# Patient Record
Sex: MALE | Race: WHITE | NOT HISPANIC OR LATINO | Employment: FULL TIME | URBAN - METROPOLITAN AREA
[De-identification: names, ages, dates, MRNs, and addresses within clinical notes are randomized per-mention and may not be internally consistent; named-entity substitution may affect disease eponyms.]

---

## 2018-01-11 ENCOUNTER — HOSPITAL ENCOUNTER (OUTPATIENT)
Dept: PREADMISSION TESTING | Facility: HOSPITAL | Age: 28
Discharge: HOME | End: 2018-01-11
Attending: SURGERY
Payer: MEDICAID

## 2018-01-11 VITALS
TEMPERATURE: 98 F | OXYGEN SATURATION: 99 % | BODY MASS INDEX: 30.96 KG/M2 | HEART RATE: 76 BPM | HEIGHT: 69 IN | RESPIRATION RATE: 16 BRPM | SYSTOLIC BLOOD PRESSURE: 127 MMHG | WEIGHT: 209 LBS | DIASTOLIC BLOOD PRESSURE: 74 MMHG

## 2018-01-11 DIAGNOSIS — Z01.818 PRE-OP TESTING: Primary | ICD-10-CM

## 2018-01-11 LAB
BASOPHILS # BLD AUTO: 0.06 K/UL
BASOPHILS NFR BLD: 0.8 %
DIFFERENTIAL METHOD: ABNORMAL
EOSINOPHIL # BLD AUTO: 0.2 K/UL
EOSINOPHIL NFR BLD: 2.9 %
ERYTHROCYTE [DISTWIDTH] IN BLOOD BY AUTOMATED COUNT: 12.4 %
HCT VFR BLD AUTO: 38.7 %
HGB BLD-MCNC: 13.8 G/DL
LYMPHOCYTES # BLD AUTO: 3.5 K/UL
LYMPHOCYTES NFR BLD: 44.1 %
MCH RBC QN AUTO: 30.7 PG
MCHC RBC AUTO-ENTMCNC: 35.7 G/DL
MCV RBC AUTO: 86 FL
MONOCYTES # BLD AUTO: 0.5 K/UL
MONOCYTES NFR BLD: 6.4 %
NEUTROPHILS # BLD AUTO: 3.6 K/UL
NEUTROPHILS NFR BLD: 45.8 %
PLATELET # BLD AUTO: 248 K/UL
PMV BLD AUTO: 9 FL
RBC # BLD AUTO: 4.5 M/UL
WBC # BLD AUTO: 7.82 K/UL

## 2018-01-11 PROCEDURE — 36415 COLL VENOUS BLD VENIPUNCTURE: CPT

## 2018-01-11 PROCEDURE — 85025 COMPLETE CBC W/AUTO DIFF WBC: CPT

## 2018-01-11 RX ORDER — IBUPROFEN 200 MG
400 TABLET ORAL EVERY 6 HOURS PRN
COMMUNITY
End: 2022-11-23

## 2018-01-11 NOTE — DISCHARGE INSTRUCTIONS
Your surgery is scheduled for _Thursday Jan. 18, 2018___________________.    Call [phone] between 2 p.m. and 5 p.m. on   _Wednesday______________ to find out your arrival time for the day of your surgery.      Please report to SAME DAY SURGERY UNIT on the 2nd FLOOR at _______ a.m.  Use front door entrance. The doors open at 0530 am.          INSTRUCTIONS IMPORTANT!!!  ¨ Do not eat or drink after 12 midnight-including water. OK to brush teeth, no   gum, candy or mints!      _x___  Prep instructions:    SHOWER     _x___  Please shower using Hibiclens soap the night before AND  the morning of  your surgery/procedure. Do not use Hibiclens on your face or genitals                 Rinse hibiclens off completely.  _x___  No shaving of procedural area at least 4-5 days before surgery due to  increased risk of skin irritation and/or possible infection.  _x___  No powder, lotions or creams to your body.  _x___  You may wear only deodorant on the day of surgery.  _x___  Please remove all jewelry, including piercings and leave at home.  _x___  No money or valuables needed. Please leave at home.  You may bring your  cell phone.  ____  Please bring any documents given by your doctor.  _x___  If going home the same day, arrange for a ride home. You will not be able to drive if Anesthesia was used.  _x__  Wear loose fitting clothing. Allow for dressings, bandages.  _x___  Stop Aspirin, Ibuprofen, Motrin and Aleve at least 3-5 days before surgery, unless otherwise instructed by your doctor, or the nurse.              You MAY use Tylenol/acetaminophen until day of surgery.  _x___  Call MD for temperature above 101 degrees.        _x___ Stop taking any Fish Oil supplement or any Vitamins that contain Vitamin  E at least 5 days prior to surgery.              I have read or had read and explained to me, and understand the above information.  Additional comments or instructions:Please call   [phone] if you have any questions  regarding the instructions above.

## 2018-01-18 ENCOUNTER — ANESTHESIA (OUTPATIENT)
Dept: SURGERY | Facility: HOSPITAL | Age: 28
End: 2018-01-18
Payer: MEDICAID

## 2018-01-18 ENCOUNTER — ANESTHESIA EVENT (OUTPATIENT)
Dept: SURGERY | Facility: HOSPITAL | Age: 28
End: 2018-01-18
Payer: MEDICAID

## 2018-01-18 ENCOUNTER — HOSPITAL ENCOUNTER (OUTPATIENT)
Facility: HOSPITAL | Age: 28
Discharge: HOME | End: 2018-01-18
Attending: SURGERY | Admitting: SURGERY
Payer: MEDICAID

## 2018-01-18 VITALS
DIASTOLIC BLOOD PRESSURE: 56 MMHG | WEIGHT: 208.94 LBS | BODY MASS INDEX: 30.95 KG/M2 | OXYGEN SATURATION: 98 % | RESPIRATION RATE: 16 BRPM | TEMPERATURE: 98 F | HEIGHT: 69 IN | HEART RATE: 84 BPM | SYSTOLIC BLOOD PRESSURE: 115 MMHG

## 2018-01-18 DIAGNOSIS — L05.91 PILONIDAL CYST: Primary | ICD-10-CM

## 2018-01-18 PROCEDURE — 25000003 PHARM REV CODE 250: Performed by: NURSE ANESTHETIST, CERTIFIED REGISTERED

## 2018-01-18 PROCEDURE — 71000039 HC RECOVERY, EACH ADD'L HOUR: Performed by: SURGERY

## 2018-01-18 PROCEDURE — 88304 TISSUE EXAM BY PATHOLOGIST: CPT

## 2018-01-18 PROCEDURE — 63600175 PHARM REV CODE 636 W HCPCS: Performed by: NURSE ANESTHETIST, CERTIFIED REGISTERED

## 2018-01-18 PROCEDURE — S0030 INJECTION, METRONIDAZOLE: HCPCS | Performed by: SURGERY

## 2018-01-18 PROCEDURE — 25000003 PHARM REV CODE 250: Performed by: SURGERY

## 2018-01-18 PROCEDURE — 63600175 PHARM REV CODE 636 W HCPCS: Performed by: ANESTHESIOLOGY

## 2018-01-18 PROCEDURE — 00300 ANES ALL PX INTEG H/N/PTRUNK: CPT | Performed by: SURGERY

## 2018-01-18 PROCEDURE — 37000009 HC ANESTHESIA EA ADD 15 MINS: Performed by: SURGERY

## 2018-01-18 PROCEDURE — 25000003 PHARM REV CODE 250: Performed by: ANESTHESIOLOGY

## 2018-01-18 PROCEDURE — D9220A PRA ANESTHESIA: Mod: ANES,,, | Performed by: ANESTHESIOLOGY

## 2018-01-18 PROCEDURE — 88304 TISSUE EXAM BY PATHOLOGIST: CPT | Mod: 26,,,

## 2018-01-18 PROCEDURE — 71000033 HC RECOVERY, INTIAL HOUR: Performed by: SURGERY

## 2018-01-18 PROCEDURE — 63600175 PHARM REV CODE 636 W HCPCS: Performed by: SURGERY

## 2018-01-18 PROCEDURE — 36000707: Performed by: SURGERY

## 2018-01-18 PROCEDURE — D9220A PRA ANESTHESIA: Mod: CRNA,,, | Performed by: NURSE ANESTHETIST, CERTIFIED REGISTERED

## 2018-01-18 PROCEDURE — 36000706: Performed by: SURGERY

## 2018-01-18 PROCEDURE — 37000008 HC ANESTHESIA 1ST 15 MINUTES: Performed by: SURGERY

## 2018-01-18 PROCEDURE — 71000015 HC POSTOP RECOV 1ST HR: Performed by: SURGERY

## 2018-01-18 RX ORDER — MIDAZOLAM HYDROCHLORIDE 1 MG/ML
INJECTION, SOLUTION INTRAMUSCULAR; INTRAVENOUS
Status: DISCONTINUED | OUTPATIENT
Start: 2018-01-18 | End: 2018-01-18

## 2018-01-18 RX ORDER — GLYCOPYRROLATE 0.2 MG/ML
INJECTION INTRAMUSCULAR; INTRAVENOUS
Status: DISCONTINUED | OUTPATIENT
Start: 2018-01-18 | End: 2018-01-18

## 2018-01-18 RX ORDER — CIPROFLOXACIN 2 MG/ML
400 INJECTION, SOLUTION INTRAVENOUS
Status: COMPLETED | OUTPATIENT
Start: 2018-01-18 | End: 2018-01-18

## 2018-01-18 RX ORDER — FENTANYL CITRATE 50 UG/ML
INJECTION, SOLUTION INTRAMUSCULAR; INTRAVENOUS
Status: DISCONTINUED | OUTPATIENT
Start: 2018-01-18 | End: 2018-01-18

## 2018-01-18 RX ORDER — SUCCINYLCHOLINE CHLORIDE 20 MG/ML
INJECTION INTRAMUSCULAR; INTRAVENOUS
Status: DISCONTINUED | OUTPATIENT
Start: 2018-01-18 | End: 2018-01-18

## 2018-01-18 RX ORDER — SODIUM CHLORIDE 0.9 % (FLUSH) 0.9 %
3 SYRINGE (ML) INJECTION
Status: DISCONTINUED | OUTPATIENT
Start: 2018-01-18 | End: 2018-01-18 | Stop reason: HOSPADM

## 2018-01-18 RX ORDER — ONDANSETRON 2 MG/ML
INJECTION INTRAMUSCULAR; INTRAVENOUS
Status: DISCONTINUED | OUTPATIENT
Start: 2018-01-18 | End: 2018-01-18

## 2018-01-18 RX ORDER — ACETAMINOPHEN 10 MG/ML
1000 INJECTION, SOLUTION INTRAVENOUS ONCE
Status: COMPLETED | OUTPATIENT
Start: 2018-01-18 | End: 2018-01-18

## 2018-01-18 RX ORDER — ROCURONIUM BROMIDE 10 MG/ML
INJECTION, SOLUTION INTRAVENOUS
Status: DISCONTINUED | OUTPATIENT
Start: 2018-01-18 | End: 2018-01-18

## 2018-01-18 RX ORDER — METOCLOPRAMIDE HYDROCHLORIDE 5 MG/ML
10 INJECTION INTRAMUSCULAR; INTRAVENOUS EVERY 10 MIN PRN
Status: DISCONTINUED | OUTPATIENT
Start: 2018-01-18 | End: 2018-01-18 | Stop reason: HOSPADM

## 2018-01-18 RX ORDER — SODIUM CHLORIDE, SODIUM LACTATE, POTASSIUM CHLORIDE, CALCIUM CHLORIDE 600; 310; 30; 20 MG/100ML; MG/100ML; MG/100ML; MG/100ML
INJECTION, SOLUTION INTRAVENOUS CONTINUOUS
Status: DISCONTINUED | OUTPATIENT
Start: 2018-01-18 | End: 2018-01-18 | Stop reason: HOSPADM

## 2018-01-18 RX ORDER — HYDROMORPHONE HYDROCHLORIDE 2 MG/ML
0.2 INJECTION, SOLUTION INTRAMUSCULAR; INTRAVENOUS; SUBCUTANEOUS EVERY 5 MIN PRN
Status: DISCONTINUED | OUTPATIENT
Start: 2018-01-18 | End: 2018-01-18 | Stop reason: HOSPADM

## 2018-01-18 RX ORDER — LIDOCAINE HYDROCHLORIDE 10 MG/ML
0.5 INJECTION, SOLUTION EPIDURAL; INFILTRATION; INTRACAUDAL; PERINEURAL ONCE
Status: DISCONTINUED | OUTPATIENT
Start: 2018-01-18 | End: 2018-01-18 | Stop reason: HOSPADM

## 2018-01-18 RX ORDER — MEPERIDINE HYDROCHLORIDE 50 MG/ML
12.5 INJECTION INTRAMUSCULAR; INTRAVENOUS; SUBCUTANEOUS ONCE AS NEEDED
Status: DISCONTINUED | OUTPATIENT
Start: 2018-01-18 | End: 2018-01-18 | Stop reason: HOSPADM

## 2018-01-18 RX ORDER — MEPERIDINE HYDROCHLORIDE 50 MG/ML
12.5 INJECTION INTRAMUSCULAR; INTRAVENOUS; SUBCUTANEOUS ONCE
Status: COMPLETED | OUTPATIENT
Start: 2018-01-18 | End: 2018-01-18

## 2018-01-18 RX ORDER — SODIUM CHLORIDE 0.9 G/100ML
IRRIGANT IRRIGATION
Status: DISCONTINUED | OUTPATIENT
Start: 2018-01-18 | End: 2018-01-18 | Stop reason: HOSPADM

## 2018-01-18 RX ORDER — METRONIDAZOLE 500 MG/100ML
500 INJECTION, SOLUTION INTRAVENOUS
Status: COMPLETED | OUTPATIENT
Start: 2018-01-18 | End: 2018-01-18

## 2018-01-18 RX ORDER — PROPOFOL 10 MG/ML
VIAL (ML) INTRAVENOUS
Status: DISCONTINUED | OUTPATIENT
Start: 2018-01-18 | End: 2018-01-18

## 2018-01-18 RX ORDER — LIDOCAINE HCL/PF 100 MG/5ML
SYRINGE (ML) INTRAVENOUS
Status: DISCONTINUED | OUTPATIENT
Start: 2018-01-18 | End: 2018-01-18

## 2018-01-18 RX ORDER — DIPHENHYDRAMINE HYDROCHLORIDE 50 MG/ML
25 INJECTION INTRAMUSCULAR; INTRAVENOUS EVERY 6 HOURS PRN
Status: DISCONTINUED | OUTPATIENT
Start: 2018-01-18 | End: 2018-01-18 | Stop reason: HOSPADM

## 2018-01-18 RX ORDER — PHENYLEPHRINE HYDROCHLORIDE 10 MG/ML
INJECTION INTRAVENOUS
Status: DISCONTINUED | OUTPATIENT
Start: 2018-01-18 | End: 2018-01-18

## 2018-01-18 RX ADMIN — CIPROFLOXACIN 400 MG: 2 INJECTION, SOLUTION INTRAVENOUS at 12:01

## 2018-01-18 RX ADMIN — PROPOFOL 20 MG: 10 INJECTION, EMULSION INTRAVENOUS at 01:01

## 2018-01-18 RX ADMIN — MIDAZOLAM HYDROCHLORIDE 2 MG: 1 INJECTION, SOLUTION INTRAMUSCULAR; INTRAVENOUS at 12:01

## 2018-01-18 RX ADMIN — ONDANSETRON 4 MG: 2 INJECTION, SOLUTION INTRAMUSCULAR; INTRAVENOUS at 12:01

## 2018-01-18 RX ADMIN — HYDROMORPHONE HYDROCHLORIDE 0.2 MG: 2 INJECTION INTRAMUSCULAR; INTRAVENOUS; SUBCUTANEOUS at 02:01

## 2018-01-18 RX ADMIN — ACETAMINOPHEN 1000 MG: 10 INJECTION, SOLUTION INTRAVENOUS at 02:01

## 2018-01-18 RX ADMIN — SUCCINYLCHOLINE CHLORIDE 140 MG: 20 INJECTION, SOLUTION INTRAMUSCULAR; INTRAVENOUS at 12:01

## 2018-01-18 RX ADMIN — ROCURONIUM BROMIDE 25 MG: 10 INJECTION, SOLUTION INTRAVENOUS at 01:01

## 2018-01-18 RX ADMIN — MEPERIDINE HYDROCHLORIDE 12.5 MG: 50 INJECTION INTRAMUSCULAR; INTRAVENOUS; SUBCUTANEOUS at 01:01

## 2018-01-18 RX ADMIN — PROPOFOL 180 MG: 10 INJECTION, EMULSION INTRAVENOUS at 12:01

## 2018-01-18 RX ADMIN — METRONIDAZOLE 500 MG: 500 INJECTION, SOLUTION INTRAVENOUS at 12:01

## 2018-01-18 RX ADMIN — ROCURONIUM BROMIDE 5 MG: 10 INJECTION, SOLUTION INTRAVENOUS at 12:01

## 2018-01-18 RX ADMIN — GLYCOPYRROLATE 0.2 MG: 0.2 INJECTION, SOLUTION INTRAMUSCULAR; INTRAVENOUS at 12:01

## 2018-01-18 RX ADMIN — PHENYLEPHRINE HYDROCHLORIDE 200 MCG: 10 INJECTION INTRAVENOUS at 01:01

## 2018-01-18 RX ADMIN — LIDOCAINE HYDROCHLORIDE 100 MG: 20 INJECTION, SOLUTION INTRAVENOUS at 12:01

## 2018-01-18 RX ADMIN — FENTANYL CITRATE 100 MCG: 50 INJECTION INTRAMUSCULAR; INTRAVENOUS at 12:01

## 2018-01-18 RX ADMIN — SODIUM CHLORIDE, SODIUM LACTATE, POTASSIUM CHLORIDE, AND CALCIUM CHLORIDE 10 ML/HR: 600; 310; 30; 20 INJECTION, SOLUTION INTRAVENOUS at 10:01

## 2018-01-18 RX ADMIN — PHENYLEPHRINE HYDROCHLORIDE 300 MCG: 10 INJECTION INTRAVENOUS at 01:01

## 2018-01-18 NOTE — ANESTHESIA POSTPROCEDURE EVALUATION
"Anesthesia Post Evaluation    Patient: Matt Yoo    Procedure(s) Performed: Procedure(s) (LRB):  [id number] (N/A)    Final Anesthesia Type: general  Patient location during evaluation: PACU  Patient participation: Yes- Able to Participate  Level of consciousness: awake and alert, oriented and awake  Post-procedure vital signs: reviewed and stable  Pain management: adequate  Airway patency: patent  PONV status at discharge: No PONV  Anesthetic complications: no      Cardiovascular status: blood pressure returned to baseline  Respiratory status: unassisted and spontaneous ventilation  Hydration status: euvolemic  Follow-up not needed.        Visit Vitals  BP (!) 116/59   Pulse 88   Temp 36.5 °C (97.7 °F) (Oral)   Resp 18   Ht 5' 9" (1.753 m)   Wt 94.8 kg (208 lb 15 oz)   SpO2 96%   BMI 30.85 kg/m²       Pain/Henok Score: Pain Assessment Performed: Yes (1/18/2018  2:51 PM)  Presence of Pain: complains of pain/discomfort (1/18/2018  2:51 PM)  Pain Rating Prior to Med Admin: 5 (1/18/2018  2:35 PM)  Henok Score: 10 (1/18/2018  2:15 PM)      "

## 2018-01-18 NOTE — DISCHARGE INSTRUCTIONS
[facility]  Office # [phone]     Discharge Instructions for Same Day Surgery     Call the office for and appointment if one has not already been made.     Diet: Drink plenty of fluids the first 48 hours and you may resume your   usual diet.     Activity: No heavy lifting (over 10 pounds), pushing or pulling until your   post op visit. Your doctor's office may have told you to limit your lifting to less weight, or even no weight.  Be sure to follow those instructions.    Note: You may ride in a car and you may drive when comfortable.     Do not drive, drink alcohol, or sign legal documents for 24 hours, or if taking narcotic pain medication.    Dressings: Remove the dressing 48 hours after surgery. You may shower   48 hours after surgery and you may wash your hair.     If you have steri strips ( appears to be strips of white tape) on   your incision, leave them on.     Medical: Call the doctor for any of the following problems: fever above 101,   severe pain, bleeding, or abdominal distention (swelling).   If constipated you may take any stool softener you choose.     Occasionally small areas of skin numbness or an unpleasant skin sensation can result. Also, you may find that your incision is swollen and tender for a few days.  Some redness around sutures and staples is a normal reaction, but if the discomfort persists or worsens, call you doctor.   Fall Prevention  Millions of people fall every year and injure themselves. You may have had anesthesia or sedation which may increase your risk of falling. You may have health issues that put you at an increased risk of falling.     Here are ways to reduce your risk of falling.  ·   · Make your home safe by keeping walkways clear of objects you may trip over.  · Use non-slip pads under rugs. Do not use area rugs or small throw rugs.  · Use non-slip mats in bathtubs and showers.  · Install handrails and lights on staircases.  · Do not walk in  poorly lit areas.  · Do not stand on chairs or wobbly ladders.  · Use caution when reaching overhead or looking upward. This position can cause a loss of balance.  · Be sure your shoes fit properly, have non-slip bottoms and are in good condition.   · Wear shoes both inside and out. Avoid going barefoot or wearing slippers.  · Be cautious when going up and down stairs, curbs, and when walking on uneven sidewalks.  · If your balance is poor, consider using a cane or walker.  · If your fall was related to alcohol use, stop or limit alcohol intake.   · If your fall was related to use of sleeping medicines, talk to your doctor about this. You may need to reduce your dosage at bedtime if you awaken during the night to go to the bathroom.    · To reduce the need for nighttime bathroom trips:  ¨ Avoid drinking fluids for several hours before going to bed  ¨ Empty your bladder before going to bed  ¨ Men can keep a urinal at the bedside  · Stay as active as you can. Balance, flexibility, strength, and endurance all come from exercise. They all play a role in preventing falls. Ask your healthcare provider which types of activity are right for you.  · Get your vision checked on a regular basis.  · If you have pets, know where they are before you stand up or walk so you don't trip over them.  · Use night lights.

## 2018-01-18 NOTE — BRIEF OP NOTE
[facility]  Brief Operative Note     SUMMARY     Surgery Date: 1/18/2018     Surgeon(s) and Role:     * Raphael Rouse MD - Primary    Assisting Surgeon: None    Pre-op Diagnosis:  Pilonidal cyst [L05.91]    Post-op Diagnosis:  Post-Op Diagnosis Codes:     * Pilonidal cyst [L05.91]    Procedure(s) (LRB):  [id number] (N/A)    Anesthesia: General    Description of the findings of the procedure: same    Findings/Key Components:same  Estimated Blood Loss: 0         Specimens:   Specimen (12h ago through future)    Start     Ordered    01/18/18 1317  Specimen to Pathology - Surgery  Once     Comments:  pilonidal cyst      01/18/18 1332          Discharge Note    SUMMARY     Admit Date: 1/18/2018    Discharge Date and Time:  01/18/2018 1:38 PM    Hospital Course sds to home no complications    Final Diagnosis: Post-Op Diagnosis Codes:     * Pilonidal cyst [L05.91]    Disposition: Home or Self Care    Follow Up/Patient Instructions:     Medications:  Reconciled Home Medications:   Current Discharge Medication List      CONTINUE these medications which have NOT CHANGED    Details   DM/P-EPHED/ACETAMINOPH/DOXYLAM (NYQUIL ORAL) Take 1 tablet by mouth daily as needed.      ibuprofen (ADVIL,MOTRIN) 200 MG tablet Take 400 mg by mouth every 6 (six) hours as needed for Pain.      buprenorphine HCl (SUBUTEX) 8 mg Subl Place 4 mg under the tongue 4 (four) times daily as needed.  Refills: 0             Discharge Procedure Orders  Diet Adult Regular     Shower on day dressing removed (No bath)     Notify your health care provider if you experience any of the following:  temperature >100.4     Notify your health care provider if you experience any of the following:  severe uncontrolled pain     Notify your health care provider if you experience any of the following:  redness, tenderness, or signs of infection (pain, swelling, redness, odor or green/yellow discharge around incision site)     Remove  dressing in 48 hours       Follow-up Information     Raphael Rouse MD In 1 week.    Specialty:  General Surgery  Contact information:  [address] [place], [place] [id number] [phone]

## 2018-01-18 NOTE — ANESTHESIA PREPROCEDURE EVALUATION
01/18/2018  Matt Yoo is a 27 y.o., male.    Anesthesia Evaluation    I have reviewed the Patient Summary Reports.     I have reviewed the Medications.     Review of Systems  Anesthesia Hx:  No problems with previous Anesthesia   Denies Personal Hx of Anesthesia complications.   Hematology/Oncology:  Hematology Normal   Oncology Normal     EENT/Dental:EENT/Dental Normal   Cardiovascular:  Cardiovascular Normal     Pulmonary:  Pulmonary Normal    Renal/:  Renal/ Normal     Hepatic/GI:  Hepatic/GI Normal    Musculoskeletal:  Musculoskeletal Normal    Neurological:  Neurology Normal    Endocrine:  Endocrine Normal    Dermatological:  Skin Normal    Psych:  Psychiatric Normal           Physical Exam  General:  Well nourished    Airway/Jaw/Neck:  Airway Findings: Mouth Opening: Normal Tongue: Normal  Mallampati: II      Dental:  Dental Findings: In tact   Chest/Lungs:  Chest/Lungs Clear    Heart/Vascular:  Heart Findings: Normal Heart murmur: negative       Mental Status:  Mental Status Findings:  Cooperative, Alert and Oriented         Anesthesia Plan  Type of Anesthesia, risks & benefits discussed:  Anesthesia Type:  general, MAC, regional  Patient's Preference:   Intra-op Monitoring Plan: standard ASA monitors  Intra-op Monitoring Plan Comments:   Post Op Pain Control Plan: multimodal analgesia  Post Op Pain Control Plan Comments:   Induction:   IV  Beta Blocker:  Patient is not currently on a Beta-Blocker (No further documentation required).       Informed Consent: Patient understands risks and agrees with Anesthesia plan.  Questions answered. Anesthesia consent signed with patient.  ASA Score: 2     Day of Surgery Review of History & Physical:            Ready For Surgery From Anesthesia Perspective.

## 2018-01-18 NOTE — OR NURSING
Transported by RN to [facility] per stretcher.  Patient belongings bag, to include patient's cell phone and cash, on stretcher with patient returned to [facility].

## 2018-01-18 NOTE — TRANSFER OF CARE
"Anesthesia Transfer of Care Note    Patient: Matt Yoo    Procedure(s) Performed: Procedure(s) (LRB):  [id number] (N/A)    Patient location: PACU    Anesthesia Type: general    Transport from OR: Transported from OR on room air with adequate spontaneous ventilation    Post pain: adequate analgesia    Post assessment: no apparent anesthetic complications and tolerated procedure well    Post vital signs: stable    Level of consciousness: awake, alert and oriented    Nausea/Vomiting: no nausea/vomiting    Complications: none    Transfer of care protocol was followed      Last vitals:   Visit Vitals  BP (!) 123/91 (BP Location: Right arm, Patient Position: Lying)   Pulse (!) 117   Temp 36.5 °C (97.7 °F) (Oral)   Resp 18   Ht 5' 9" (1.753 m)   Wt 94.8 kg (208 lb 15 oz)   SpO2 100%   BMI 30.85 kg/m²     "

## 2018-01-19 NOTE — OP NOTE
DATE OF PROCEDURE:  01/18/2018    PREOPERATIVE DIAGNOSIS:  Pilonidal cyst and sinus.    POSTOPERATIVE DIAGNOSIS:  Pilonidal cyst and sinus.    OPERATIVE PROCEDURE:  Excision of pilonidal cyst and sinus.    SURGEON:  Raphael Rouse MD    PROCEDURE IN DETAIL:  After adequate premedication, the patient was taken to the   Operating Room and placed on operating table in supine position.  General   anesthesia administered, then turned prone.  The pilonidal area prepped and   draped in sterile fashion.  Elliptical incision was made to accomplish all of   the sinus tracts, deepened down through the subcutaneous tissue.  This was   carried down almost to the sacral area.  Bleeding was controlled with   electrocautery.  The wound was then closed in multiple layers using 2 layers of   3-0 Vicryl and the skin closed with interrupted 3-0 nylon.  Blood loss was   negligible.  The patient tolerated the procedure and was transported to the   Recovery Room in stable condition.      KEESHA/IN  dd: 01/18/2018 14:17:49 (CST)  td: 01/18/2018 18:43:47 (CST)  Doc ID   #[id number]  Job ID #[id number]    CC:

## 2018-03-06 ENCOUNTER — HOSPITAL ENCOUNTER (EMERGENCY)
Facility: HOSPITAL | Age: 28
Discharge: HOME | End: 2018-03-06
Attending: EMERGENCY MEDICINE
Payer: MEDICAID

## 2018-03-06 VITALS
RESPIRATION RATE: 16 BRPM | DIASTOLIC BLOOD PRESSURE: 67 MMHG | HEIGHT: 69 IN | BODY MASS INDEX: 27.55 KG/M2 | WEIGHT: 186 LBS | TEMPERATURE: 99 F | HEART RATE: 86 BPM | SYSTOLIC BLOOD PRESSURE: 109 MMHG | OXYGEN SATURATION: 95 %

## 2018-03-06 DIAGNOSIS — M25.569 KNEE PAIN: ICD-10-CM

## 2018-03-06 DIAGNOSIS — S82.832A CLOSED FRACTURE OF PROXIMAL END OF LEFT FIBULA, UNSPECIFIED FRACTURE MORPHOLOGY, INITIAL ENCOUNTER: Primary | ICD-10-CM

## 2018-03-06 PROCEDURE — 29505 APPLICATION LONG LEG SPLINT: CPT | Mod: LT

## 2018-03-06 PROCEDURE — 99283 EMERGENCY DEPT VISIT LOW MDM: CPT | Mod: 25

## 2018-03-06 NOTE — DISCHARGE INSTRUCTIONS
Follow up with the orthopedist listed.  Call and make an appointment.  Return to the ED for any changing or concerning symptoms.  Rest.  Apply ice and elevate frequently.  Keep the knee immobilizer on the knee.  No weight bearing to the left knee/leg.

## 2018-03-06 NOTE — ED TRIAGE NOTES
Patient came in with c/o LEFT sided knee pain after a fall yesterday, patient states that he can not walk on it.

## 2018-03-06 NOTE — ED PROVIDER NOTES
Encounter Date: 3/6/2018    SCRIBE #1 NOTE: I, Cristobalgladis Davy, am scribing for, and in the presence of,  Pamela Padilla PA-C. I have scribed the following portions of the note - Other sections scribed: HPI, ROS.       History     Chief Complaint   Patient presents with    Fall     Fell from a two story building yesterday and now his left knee is swollen      CC: Fall    29 y/o male with chronic back pain presents to the ED c/o acute onset L sided knee pain and swelling due to an accidental mechanical fall yesterday. The pain is moderate (4/10) and has progressively worsened. Ambulation exacerbates the pain; sitting down and resting alleviates the pain. 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 He reports landing on his bilateral feet onto the first floor. The patient states that 30 min after the fall he was able to continue working. He then reports pain 3 hrs after the fall that resolved; however, this morning he immediately noticed severe pain and swelling. There is bruising noted to his R sided abdomen; however, he's mostly concerned about his L sided knee. The patient denies head trauma, LOC, SOB, back pain, neck pain, or ankle pain. No other symptoms reported.      The history is provided by the patient. No  was used.     Review of patient's allergies indicates:  No Known Allergies  Past Medical History:   Diagnosis Date    Chronic back pain      Past Surgical History:   Procedure Laterality Date     hand  2013     History reviewed. No pertinent family history.  Social History   Substance Use Topics    Smoking status: Current Every Day Smoker     Packs/day: 0.50     Years: 6.00     Types: Cigarettes    Smokeless tobacco: Never Used    Alcohol use No     Review of Systems   Constitutional: Negative for chills and fever.   HENT: Negative for rhinorrhea.    Eyes: Negative for  redness.   Respiratory: Negative for cough and shortness of breath.    Cardiovascular: Negative for chest pain.   Gastrointestinal: Negative for abdominal pain, diarrhea, nausea and vomiting.   Genitourinary: Negative for difficulty urinating and dysuria.   Musculoskeletal: Negative for back pain and neck pain.        (+) L sided knee pain and swelling  (-) ankle pain   Skin: Negative for rash.        (+) bruising to R sided abdomen   Neurological: Negative for headaches.        (-) head trauma  (-) LOC       Physical Exam     Initial Vitals [03/06/18 1211]   BP Pulse Resp Temp SpO2   121/71 101 16 (!) 100.4 °F (38 °C) 97 %      MAP       87.67         Physical Exam    Nursing note and vitals reviewed.  Constitutional: He appears well-developed and well-nourished. He is not diaphoretic. No distress.   HENT:   Head: Normocephalic and atraumatic.   Eyes: EOM are normal. Pupils are equal, round, and reactive to light.   Neck: Neck supple.   Cardiovascular: Normal rate and regular rhythm.   Pulmonary/Chest: Breath sounds normal. No respiratory distress.   Abdominal: Soft. Bowel sounds are normal. He exhibits no distension. There is no tenderness. There is no rebound and no guarding.   Musculoskeletal: He exhibits edema.   There is pain with range of motion of the left knee.  There is pain to palpation left knee.  There is edema noted to the left knee.  There is no ligamentous laxity with varus or valgus stress.  The patient is unable to put weight onto the left knee.  There are 2+ pedal pulses noted bilaterally.  There is no cervical, thoracic or lumbar spinal tenderness to palpation.  There is no saddle anesthesia.   Neurological: He is alert.   Skin: Skin is warm. Capillary refill takes less than 2 seconds. No rash noted. No erythema.         ED Course   Procedures  Labs Reviewed - No data to display          Medical Decision Making:   Differential Diagnosis:   This is an urgent evaluation of a 28-year-old male who  presents to the emergency department after a fall that occurred yesterday.  He states that he fell through insulation on the second story house and hit the floor.  He denied any pain initially and continued to work.  However, the next day he began to have left knee pain.    The patient is currently afebrile and nontoxic in appearance.  On triage his temperature was 100.4 however on reevaluation it was normal.  His other vital signs are stable.  On physical exam, there is tenderness with range of motion and palpation of the left knee.  There is no ligamentous laxity with varus or valgus stress.  There is normal pedal pulses noted bilaterally.  The patient is no saddle anesthesia.  There is no cervical, thoracic or lumbar spinal tenderness to palpation.  There is a bruise noted to the right lateral chest wall without any underlying bony tenderness.  The bilateral lungs are clear throughout in all fields.  I carefully considered but doubt pneumothorax, hemothorax, pneumonia, cervical or spinal fracture, cauda equina.  I doubt acute intra-abdominal injury.  The patient denies any loss of consciousness or head trauma.  I doubt acute intracranial injury.  An x-ray of the left knee was performed which revealed a cortical step-off in the left fibular head suspicious for displaced fracture.  He was placed in a knee immobilizer and given crutches.  I will send him to an orthopedist.  X-ray findings were discussed with the patient and he verbalized understanding and agreement.  He is currently safe and stable for discharge at this time.  This case was discussed with Dr. Vanegas and he is in agreement with the assessment and treatment plan.            Scribe Attestation:   Scribe #1: I performed the above scribed service and the documentation accurately describes the services I performed. I attest to the accuracy of the note.    Attending Attestation:           Physician Attestation for Scribe:  Physician Attestation Statement  for Scribe #1: I, Pamela Padilla PA-C, reviewed documentation, as scribed by Sulema Bhatti in my presence, and it is both accurate and complete.                    Clinical Impression:   The primary encounter diagnosis was Closed fracture of proximal end of left fibula, unspecified fracture morphology, initial encounter. A diagnosis of Knee pain was also pertinent to this visit.    Disposition:   Disposition: Discharged  Condition: Stable                        Pamela Espinoza PA-C  03/06/18 1901

## 2018-03-07 ENCOUNTER — PES CALL (OUTPATIENT)
Dept: ADMINISTRATIVE | Facility: CLINIC | Age: 28
End: 2018-03-07

## 2018-08-15 ENCOUNTER — HOSPITAL ENCOUNTER (EMERGENCY)
Facility: HOSPITAL | Age: 28
Discharge: HOME | End: 2018-08-15
Attending: EMERGENCY MEDICINE
Payer: MEDICAID

## 2018-08-15 VITALS
OXYGEN SATURATION: 100 % | DIASTOLIC BLOOD PRESSURE: 97 MMHG | TEMPERATURE: 98 F | HEIGHT: 69 IN | SYSTOLIC BLOOD PRESSURE: 139 MMHG | RESPIRATION RATE: 18 BRPM | BODY MASS INDEX: 28.14 KG/M2 | WEIGHT: 190 LBS | HEART RATE: 82 BPM

## 2018-08-15 DIAGNOSIS — K08.89 PAIN, DENTAL: Primary | ICD-10-CM

## 2018-08-15 PROCEDURE — 64400 NJX AA&/STRD TRIGEMINAL NRV: CPT

## 2018-08-15 PROCEDURE — 25000003 PHARM REV CODE 250: Performed by: EMERGENCY MEDICINE

## 2018-08-15 PROCEDURE — 99283 EMERGENCY DEPT VISIT LOW MDM: CPT | Mod: 25

## 2018-08-15 PROCEDURE — 64400 NJX AA&/STRD TRIGEMINAL NRV: CPT | Mod: LT

## 2018-08-15 RX ORDER — BUPIVACAINE HCL/EPINEPHRINE 0.5-1:200K
3.6 VIAL (ML) INJECTION
Status: COMPLETED | OUTPATIENT
Start: 2018-08-15 | End: 2018-08-15

## 2018-08-15 RX ORDER — BUPIVACAINE HYDROCHLORIDE AND EPINEPHRINE 5; 5 MG/ML; UG/ML
3.6 INJECTION, SOLUTION EPIDURAL; INTRACAUDAL; PERINEURAL
Status: DISCONTINUED | OUTPATIENT
Start: 2018-08-15 | End: 2018-08-15 | Stop reason: RX

## 2018-08-15 RX ADMIN — BUPIVACAINE HYDROCHLORIDE AND EPINEPHRINE 3.6 ML: 5; 5 INJECTION, SOLUTION PERINEURAL at 04:08

## 2018-08-15 NOTE — ED PROVIDER NOTES
Encounter Date: 8/15/2018       History     Chief Complaint   Patient presents with    Dental Pain     Pt c/o increasing pain to left lower mouth.  Reports tooth was pulled two days ago.  Reports taking pain medication about 6 hours ago.  Pain is not getting better.       28-year-old male with no pertinent past medical history presents to the emergency department for persistent left lower dental pain after tooth extraction 2 days ago.  Patient is receiving no relief with Norco taken 6 hr ago and 3 doses of 800 mg of ibuprofen taken over the past 24 hr.  Denies fever.  Patient also reports being on a course of amoxicillin that he is compliant with.  Patient is requesting pain relief until he can see his dentist within the next 6 hr (it is currently 3:40 in the morning).           Review of patient's allergies indicates:  No Known Allergies  Past Medical History:   Diagnosis Date    Chronic back pain      Past Surgical History:   Procedure Laterality Date     hand  2013     No family history on file.  Social History     Tobacco Use    Smoking status: Current Every Day Smoker     Packs/day: 0.50     Years: 6.00     Pack years: 3.00     Types: Cigarettes    Smokeless tobacco: Never Used   Substance Use Topics    Alcohol use: No    Drug use: No     Review of Systems   Constitutional: Negative for fever.   HENT: Positive for dental problem. Negative for facial swelling, sinus pressure, sore throat, trouble swallowing and voice change.    Respiratory: Negative for shortness of breath.    Cardiovascular: Negative for chest pain.   Gastrointestinal: Negative for abdominal pain, nausea and vomiting.   Musculoskeletal: Negative for back pain and neck pain.   Skin: Negative for rash.   Neurological: Negative for headaches.   All other systems reviewed and are negative.      Physical Exam     Initial Vitals [08/15/18 0308]   BP Pulse Resp Temp SpO2   (!) 139/97 82 16 98.4 °F (36.9 °C) 99 %      MAP       --          Physical Exam    Nursing note and vitals reviewed.  Constitutional: He appears well-developed and well-nourished. He is not diaphoretic. No distress.   HENT:   Head: Normocephalic and atraumatic.   Right Ear: External ear normal. No mastoid tenderness.   Left Ear: External ear normal. No mastoid tenderness.   Nose: Nose normal. Right sinus exhibits no maxillary sinus tenderness and no frontal sinus tenderness. Left sinus exhibits no maxillary sinus tenderness and no frontal sinus tenderness.   Mouth/Throat: Uvula is midline and oropharynx is clear and moist. No trismus in the jaw. No dental abscesses or uvula swelling. No oropharyngeal exudate, posterior oropharyngeal edema, posterior oropharyngeal erythema or tonsillar abscesses.       L lower wisdom tooth surgically absent. No erythema to gumline. Speak in clear sentences.    Eyes: Conjunctivae and EOM are normal. Right eye exhibits no discharge. Left eye exhibits no discharge.   Neck: Normal range of motion. No tracheal deviation present. No JVD present.   Cardiovascular: Normal rate, regular rhythm and normal heart sounds. Exam reveals no friction rub.    No murmur heard.  Pulmonary/Chest: Breath sounds normal. No stridor. No respiratory distress. He has no wheezes. He has no rhonchi. He has no rales. He exhibits no tenderness.   Musculoskeletal: Normal range of motion.   Lymphadenopathy:     He has no cervical adenopathy.   Neurological: He is alert and oriented to person, place, and time.   Skin: Skin is warm and dry. No rash and no abscess noted. No erythema. No pallor.         ED Course   Nerve Block  Date/Time: 8/15/2018 3:46 AM  Location procedure was performed: [facility] EMERGENCY DEPARTMENT  Performed by: Nathan Sky PA-C  Authorized by: Tegan Ortez MD   Consent Done: Yes  Consent: Verbal consent obtained.  Consent given by: patient  Indications: pain relief  Body area: face/mouth  Nerve: inferior alveolar  Laterality: left  Patient sedated:  no  Needle size: 27 G  Location technique: anatomical landmarks  Local Anesthetic: bupivacaine 0.5% with epinephrine  Anesthetic total: 1 mL  Complications: No  Specimens: No  Implants: No  Outcome: pain improved  Patient tolerance: Patient tolerated the procedure well with no immediate complications        Labs Reviewed - No data to display       Imaging Results    None          Medical Decision Making:   History:   Old Medical Records: I decided to obtain old medical records.  Initial Assessment:   29 yo M with dental pain following extraction.   ED Management:  Given no relief with narcotics and NSAIDs, I offer dental block that alleviates pain. I advise close follow up with his dentist tomorrow. No signs of acute bacterial infection in this patient currently on Amoxil; no dental abscess or PTA. No meningeal signs or sinus component. No airway involvement.     Pain relief. Handling secretions well. Advising dentist follow up. Strict return precautions discussed. Patient agreeable to plan.   Other:   I have discussed this case with another health care provider.       <> Summary of the Discussion: Discussed with attending                       Clinical Impression:   The encounter diagnosis was Pain, dental.      Disposition:   Disposition: Discharged  Condition: Stable                        Nathan Sky PA-C  08/15/18 0414

## 2018-08-15 NOTE — ED NOTES
Past Medical History:   Diagnosis Date    Chronic back pain      Dental Pain (Pt c/o increasing pain to left lower mouth. Reports tooth was pulled two days ago. Reports taking pain medication about 6 hours ago. Pain is not getting better. )

## 2022-11-19 ENCOUNTER — HOSPITAL ENCOUNTER (EMERGENCY)
Facility: HOSPITAL | Age: 32
Discharge: HOME | End: 2022-11-19
Attending: STUDENT IN AN ORGANIZED HEALTH CARE EDUCATION/TRAINING PROGRAM
Payer: MEDICAID

## 2022-11-19 VITALS
BODY MASS INDEX: 28.04 KG/M2 | HEART RATE: 84 BPM | TEMPERATURE: 99 F | OXYGEN SATURATION: 98 % | WEIGHT: 185 LBS | SYSTOLIC BLOOD PRESSURE: 142 MMHG | HEIGHT: 68 IN | DIASTOLIC BLOOD PRESSURE: 82 MMHG | RESPIRATION RATE: 18 BRPM

## 2022-11-19 DIAGNOSIS — M54.6 THORACIC BACK PAIN: ICD-10-CM

## 2022-11-19 DIAGNOSIS — M54.6 ACUTE THORACIC BACK PAIN, UNSPECIFIED BACK PAIN LATERALITY: Primary | ICD-10-CM

## 2022-11-19 LAB
ALBUMIN SERPL BCP-MCNC: 4.9 G/DL (ref 3.5–5.2)
ALP SERPL-CCNC: <5 U/L (ref 55–135)
ALT SERPL W/O P-5'-P-CCNC: 14 U/L (ref 10–44)
ANION GAP SERPL CALC-SCNC: 13 MMOL/L (ref 8–16)
AST SERPL-CCNC: 20 U/L (ref 10–40)
BASOPHILS # BLD AUTO: 0.09 K/UL (ref 0–0.2)
BASOPHILS NFR BLD: 0.9 % (ref 0–1.9)
BILIRUB SERPL-MCNC: 0.4 MG/DL (ref 0.1–1)
BILIRUB UR QL STRIP: NEGATIVE
BNP SERPL-MCNC: <10 PG/ML (ref 0–99)
BUN SERPL-MCNC: 16 MG/DL (ref 6–20)
CALCIUM SERPL-MCNC: 9.7 MG/DL (ref 8.7–10.5)
CHLORIDE SERPL-SCNC: 102 MMOL/L (ref 95–110)
CLARITY UR: CLEAR
CO2 SERPL-SCNC: 24 MMOL/L (ref 23–29)
COLOR UR: YELLOW
CREAT SERPL-MCNC: 1 MG/DL (ref 0.5–1.4)
D DIMER PPP IA.FEU-MCNC: <0.19 MG/L FEU
DIFFERENTIAL METHOD: ABNORMAL
EOSINOPHIL # BLD AUTO: 0.3 K/UL (ref 0–0.5)
EOSINOPHIL NFR BLD: 3 % (ref 0–8)
ERYTHROCYTE [DISTWIDTH] IN BLOOD BY AUTOMATED COUNT: 11.8 % (ref 11.5–14.5)
EST. GFR  (NO RACE VARIABLE): >60 ML/MIN/1.73 M^2
GLUCOSE SERPL-MCNC: 107 MG/DL (ref 70–110)
GLUCOSE UR QL STRIP: NEGATIVE
HCT VFR BLD AUTO: 39.8 % (ref 40–54)
HGB BLD-MCNC: 14 G/DL (ref 14–18)
HGB UR QL STRIP: ABNORMAL
IMM GRANULOCYTES # BLD AUTO: 0.02 K/UL (ref 0–0.04)
IMM GRANULOCYTES NFR BLD AUTO: 0.2 % (ref 0–0.5)
KETONES UR QL STRIP: ABNORMAL
LEUKOCYTE ESTERASE UR QL STRIP: NEGATIVE
LYMPHOCYTES # BLD AUTO: 4.4 K/UL (ref 1–4.8)
LYMPHOCYTES NFR BLD: 42.8 % (ref 18–48)
MCH RBC QN AUTO: 30.5 PG (ref 27–31)
MCHC RBC AUTO-ENTMCNC: 35.2 G/DL (ref 32–36)
MCV RBC AUTO: 87 FL (ref 82–98)
MICROSCOPIC COMMENT: ABNORMAL
MONOCYTES # BLD AUTO: 0.5 K/UL (ref 0.3–1)
MONOCYTES NFR BLD: 4.6 % (ref 4–15)
NEUTROPHILS # BLD AUTO: 5 K/UL (ref 1.8–7.7)
NEUTROPHILS NFR BLD: 48.5 % (ref 38–73)
NITRITE UR QL STRIP: NEGATIVE
NRBC BLD-RTO: 0 /100 WBC
PH UR STRIP: 6 [PH] (ref 5–8)
PLATELET # BLD AUTO: 287 K/UL (ref 150–450)
PMV BLD AUTO: 9.6 FL (ref 9.2–12.9)
POTASSIUM SERPL-SCNC: 3.8 MMOL/L (ref 3.5–5.1)
PROT SERPL-MCNC: 8.5 G/DL (ref 6–8.4)
PROT UR QL STRIP: NEGATIVE
RBC # BLD AUTO: 4.59 M/UL (ref 4.6–6.2)
RBC #/AREA URNS HPF: 10 /HPF (ref 0–4)
SODIUM SERPL-SCNC: 139 MMOL/L (ref 136–145)
SP GR UR STRIP: 1.02 (ref 1–1.03)
TROPONIN I SERPL DL<=0.01 NG/ML-MCNC: 0.01 NG/ML (ref 0–0.03)
URN SPEC COLLECT METH UR: ABNORMAL
UROBILINOGEN UR STRIP-ACNC: NEGATIVE EU/DL
WBC # BLD AUTO: 10.32 K/UL (ref 3.9–12.7)

## 2022-11-19 PROCEDURE — 83880 ASSAY OF NATRIURETIC PEPTIDE: CPT | Performed by: PHYSICIAN ASSISTANT

## 2022-11-19 PROCEDURE — 80053 COMPREHEN METABOLIC PANEL: CPT | Performed by: PHYSICIAN ASSISTANT

## 2022-11-19 PROCEDURE — 84484 ASSAY OF TROPONIN QUANT: CPT | Performed by: PHYSICIAN ASSISTANT

## 2022-11-19 PROCEDURE — 85379 FIBRIN DEGRADATION QUANT: CPT | Performed by: PHYSICIAN ASSISTANT

## 2022-11-19 PROCEDURE — 93005 ELECTROCARDIOGRAM TRACING: CPT

## 2022-11-19 PROCEDURE — 99284 EMERGENCY DEPT VISIT MOD MDM: CPT | Mod: 25

## 2022-11-19 PROCEDURE — 85025 COMPLETE CBC W/AUTO DIFF WBC: CPT | Performed by: PHYSICIAN ASSISTANT

## 2022-11-19 PROCEDURE — 93010 ELECTROCARDIOGRAM REPORT: CPT | Mod: ,,, | Performed by: INTERNAL MEDICINE

## 2022-11-19 PROCEDURE — 93010 EKG 12-LEAD: ICD-10-PCS | Mod: ,,, | Performed by: INTERNAL MEDICINE

## 2022-11-19 PROCEDURE — 96360 HYDRATION IV INFUSION INIT: CPT

## 2022-11-19 PROCEDURE — 81000 URINALYSIS NONAUTO W/SCOPE: CPT | Performed by: PHYSICIAN ASSISTANT

## 2022-11-19 PROCEDURE — 25000003 PHARM REV CODE 250: Performed by: PHYSICIAN ASSISTANT

## 2022-11-19 RX ORDER — METHOCARBAMOL 500 MG/1
500 TABLET, FILM COATED ORAL 3 TIMES DAILY
Qty: 30 TABLET | Refills: 0 | Status: SHIPPED | OUTPATIENT
Start: 2022-11-19 | End: 2022-11-29

## 2022-11-19 RX ORDER — NAPROXEN 500 MG/1
500 TABLET ORAL 2 TIMES DAILY PRN
Qty: 30 TABLET | Refills: 0 | Status: SHIPPED | OUTPATIENT
Start: 2022-11-19 | End: 2022-11-29

## 2022-11-19 RX ORDER — TRAMADOL HYDROCHLORIDE 50 MG/1
50 TABLET ORAL EVERY 6 HOURS PRN
COMMUNITY
End: 2022-11-23

## 2022-11-19 RX ADMIN — SODIUM CHLORIDE 1000 ML: 0.9 INJECTION, SOLUTION INTRAVENOUS at 01:11

## 2022-11-19 NOTE — ED PROVIDER NOTES
"Encounter Date: 11/19/2022       History     Chief Complaint   Patient presents with    Back Pain     Patient arrives to ER with back pain between shoulder blades for the past 5 days. Pain is intermittent and is relieved with ibuprofren sometimes.     Chief Complaint: Back pain  History of  Present Illness: History obtained from patient. This 32 y.o. male who has no significant past medical history presents to the ED complaining of atraumatic thoracic back pain for 4 days.  Patient states "it feels like it is between my shoulder blades."  Patient endorses "I'm aggravated in my chest but I don't have chest pain."   Denies shortness of breath, cough, numbness, tingling, weakness, fever.  Patient states that he was watering his plants frequently prior to onset of symptoms.  However, he reports no exacerbating symptoms.  Patient attempted treatment with ibuprofen with minimal relief.  Denies history of PE or DVT.  Denies recent trauma, recent surgery or recent travel or hormone therapy.    Review of patient's allergies indicates:  No Known Allergies  Past Medical History:   Diagnosis Date    Chronic back pain      Past Surgical History:   Procedure Laterality Date     hand  2013     History reviewed. No pertinent family history.  Social History     Tobacco Use    Smoking status: Every Day     Packs/day: 0.50     Years: 6.00     Pack years: 3.00     Types: Cigarettes    Smokeless tobacco: Never   Substance Use Topics    Alcohol use: No    Drug use: No     Review of Systems   Constitutional:  Negative for chills and fever.   HENT:  Negative for congestion, rhinorrhea and sore throat.    Eyes:  Negative for visual disturbance.   Respiratory:  Negative for cough and shortness of breath.    Cardiovascular:  Negative for chest pain.   Gastrointestinal:  Negative for abdominal pain, diarrhea, nausea and vomiting.   Genitourinary:  Negative for dysuria, frequency and hematuria.   Musculoskeletal:  Positive for back pain. "   Skin:  Negative for rash.   Neurological:  Negative for dizziness, weakness and headaches.     Physical Exam     Initial Vitals [11/19/22 0100]   BP Pulse Resp Temp SpO2   (!) 147/93 92 18 98.8 °F (37.1 °C) 98 %      MAP       --         Physical Exam    Nursing note and vitals reviewed.  Constitutional: He appears well-developed and well-nourished. No distress.   HENT:   Head: Normocephalic and atraumatic.   Right Ear: Tympanic membrane normal.   Left Ear: Tympanic membrane normal.   Nose: Nose normal.   Mouth/Throat: Uvula is midline, oropharynx is clear and moist and mucous membranes are normal.   Eyes: EOM are normal. Pupils are equal, round, and reactive to light.   Neck: Trachea normal and phonation normal. Neck supple. No stridor present.   Normal range of motion.   Full passive range of motion without pain.     Cardiovascular:  Normal rate, regular rhythm and normal heart sounds.     Exam reveals no gallop and no friction rub.       No murmur heard.  Pulmonary/Chest: Effort normal and breath sounds normal. No respiratory distress. He has no wheezes. He has no rhonchi. He has no rales.   Abdominal: Abdomen is soft. Bowel sounds are normal. He exhibits no mass. There is no abdominal tenderness. There is no rebound and no guarding.   Musculoskeletal:         General: Normal range of motion.      Cervical back: Full passive range of motion without pain, normal range of motion and neck supple. No rigidity. No spinous process tenderness or muscular tenderness. Normal range of motion.      Comments: No C-spine, T-spine or L-spine midline tenderness.  There is no tenderness to thoracic paraspinal musculature.  No obvious deformities.  Full range of motion of the bilateral upper and lower extremities.     Neurological: He is alert and oriented to person, place, and time. He has normal strength. No cranial nerve deficit or sensory deficit.   Skin: Skin is warm and dry. Capillary refill takes less than 2 seconds.    Psychiatric: He has a normal mood and affect.       ED Course   Procedures  Labs Reviewed   CBC W/ AUTO DIFFERENTIAL   COMPREHENSIVE METABOLIC PANEL   D DIMER, QUANTITATIVE   B-TYPE NATRIURETIC PEPTIDE   TROPONIN I   URINALYSIS, REFLEX TO URINE CULTURE     EKG Readings: (Independently Interpreted)   Initial Reading: No STEMI. Rhythm: Sinus Tachycardia. Heart Rate: 116. Ectopy: No Ectopy. Conduction: Normal. ST Segments: Normal ST Segments. T Waves: Normal. Clinical Impression: Normal Sinus Rhythm     Imaging Results    None          Medications   sodium chloride 0.9% bolus 1,000 mL (0 mLs Intravenous Stopped 11/19/22 1667)     Medical Decision Making:   Differential Diagnosis:   Muscle strain, UTI, ACS, PE  ED Management:  This is an evaluation of a 32 y.o. male who presents to the ED for thoracic back pain.  Vital signs are stable.   Afebrile.  Patient is nontoxic appearing and in no acute distress.     HPI and physical exam as above.  Labs unremarkable.  D-dimer negative.  I doubt acute process given reassuring workup.  Suspect musculoskeletal strain.  Will discharge patient home symptomatic care.    Patient given return precautions and instructed to return to the emergency department for any new or worsening symptoms. Patient verbalized understanding and agreed with plan. Encouraged follow-up with PCP.                          Clinical Impression:   Final diagnoses:  [M54.6] Thoracic back pain  [M54.6] Acute thoracic back pain, unspecified back pain laterality (Primary)        ED Disposition Condition    Discharge Stable          ED Prescriptions       Medication Sig Dispense Start Date End Date Auth. Provider    naproxen (NAPROSYN) 500 MG tablet Take 1 tablet (500 mg total) by mouth 2 (two) times daily as needed (Pain). Take With Meals 30 tablet 11/19/2022 -- Jacky Rhodes PA-C    methocarbamoL (ROBAXIN) 500 MG Tab Take 1 tablet (500 mg total) by mouth 3 (three) times daily. for 10 days 30 tablet 11/19/2022  11/29/2022 Jacky Rhodes PA-C          Follow-up Information       Follow up With Specialties Details Why Contact Info    Raphael Rouse MD General Surgery, Vascular Surgery, Thoracic Diseases   [address]  [address]  [facility] [address]  [phone]      [facility] Emergency Dept Emergency Medicine Go in 1 day If symptoms worsen [address] [facility] [id number] [phone]             Jacky Rhodes PA-C  11/19/22 0353       Jacky Rhodes PA-C  11/19/22 0353

## 2022-11-21 ENCOUNTER — HOSPITAL ENCOUNTER (EMERGENCY)
Facility: HOSPITAL | Age: 32
Discharge: HOME | End: 2022-11-22
Attending: EMERGENCY MEDICINE
Payer: MEDICAID

## 2022-11-21 DIAGNOSIS — M54.6 BILATERAL THORACIC BACK PAIN, UNSPECIFIED CHRONICITY: Primary | ICD-10-CM

## 2022-11-21 DIAGNOSIS — R45.89 ANXIETY ABOUT HEALTH: ICD-10-CM

## 2022-11-21 PROCEDURE — 99283 EMERGENCY DEPT VISIT LOW MDM: CPT

## 2022-11-22 VITALS
DIASTOLIC BLOOD PRESSURE: 68 MMHG | SYSTOLIC BLOOD PRESSURE: 126 MMHG | BODY MASS INDEX: 28.04 KG/M2 | WEIGHT: 185 LBS | OXYGEN SATURATION: 100 % | HEART RATE: 68 BPM | TEMPERATURE: 98 F | HEIGHT: 68 IN | RESPIRATION RATE: 18 BRPM

## 2022-11-22 NOTE — ED PROVIDER NOTES
"Encounter Date: 11/21/2022       History     Chief Complaint   Patient presents with    Back Pain     Patient arrives to ER with back pain in upper back between shoulder blades. He was here for this same complaint 2 days ago and was assessed, but would like further assessment to figure out what is wrong. States he "doesn't want bloodwork" but would prefer to have imaging done. Also SOB that began last night     Matt Yoo is a 32-year-old male with no past medical history who presents to the emergency department with chief complaint of back pain.  He was seen in this emergency department 2 days ago with the same complaint and was discharged home with naproxen and Robaxin. He is not taken the naproxen but has taken Robaxin "a few times" with minimal relief of symptoms.  His back pain started approximately 4-5 days ago as a feeling of "discomfort" or " aggravation" that has now progressed to pain, is intermittent, and has no known triggers or alleviating factors. 4-5/10 severity.  Denies any recent falls, trauma, changes in mattress or pillow, changes in his exercise routine, or other identifiable sources of an injury.  He reports that he has been exercising daily for the past 5 months and has lost approximately 30 lb.  He says that after doing research on [employer], he is concerned that he has a bone cancer that is causing him to lose excessive amounts of weight.  He denies any fever, chills, or night sweats.  He has been smoking approximately 1/2 a pack of cigarettes per day for the last 7 years.  He does not drink alcohol or use any recreational drugs.  He has no chronic medical conditions, uses no daily medications, and has no known drug allergies.    The history is provided by the patient. No  was used.   Review of patient's allergies indicates:  No Known Allergies  Past Medical History:   Diagnosis Date    Chronic back pain      Past Surgical History:   Procedure Laterality Date    FB hand  " 2013     No family history on file.  Social History     Tobacco Use    Smoking status: Every Day     Packs/day: 0.50     Years: 6.00     Pack years: 3.00     Types: Cigarettes    Smokeless tobacco: Never   Substance Use Topics    Alcohol use: No    Drug use: No     Review of Systems   Constitutional:  Negative for chills, diaphoresis, fatigue, fever and unexpected weight change (Has been losing weight with exercise and dietary changes).        No night sweats.   HENT:  Negative for sinus pain and sore throat.    Eyes:  Negative for visual disturbance.   Respiratory:  Negative for cough, chest tightness, shortness of breath and wheezing.    Cardiovascular:  Negative for chest pain and palpitations.   Gastrointestinal:  Negative for abdominal pain, blood in stool, diarrhea, nausea and vomiting.   Genitourinary:  Negative for dysuria, frequency and urgency.   Musculoskeletal:  Positive for back pain. Negative for arthralgias, gait problem, myalgias, neck pain and neck stiffness.   Skin:  Negative for rash.   Allergic/Immunologic: Negative for environmental allergies.   Neurological:  Negative for dizziness, syncope, weakness and headaches.   Psychiatric/Behavioral:  Negative for suicidal ideas.      Physical Exam     Initial Vitals [11/21/22 2232]   BP Pulse Resp Temp SpO2   137/84 (!) 118 18 98.1 °F (36.7 °C) 100 %      MAP       --         Physical Exam    Nursing note and vitals reviewed.  Constitutional: He appears well-developed and well-nourished. He is not diaphoretic. He is cooperative. He does not appear ill. No distress.   Patient appears uncomfortable   HENT:   Head: Normocephalic and atraumatic. Head is without raccoon's eyes and without Bailey's sign.   Right Ear: External ear normal.   Left Ear: External ear normal.   Nose: Nose normal.   Mouth/Throat: Oropharynx is clear and moist and mucous membranes are normal. No posterior oropharyngeal edema or posterior oropharyngeal erythema.   Neck: Phonation  normal. Neck supple.    Full passive range of motion without pain.     Cardiovascular:  Regular rhythm, S1 normal, S2 normal and normal heart sounds.  No extrasystoles are present.  Tachycardia present.         No murmur heard.  Pulses:       Radial pulses are 2+ on the right side and 2+ on the left side.        Dorsalis pedis pulses are 2+ on the right side and 2+ on the left side.   Pulmonary/Chest: Effort normal and breath sounds normal. No accessory muscle usage. No tachypnea. No respiratory distress. He has no decreased breath sounds.   Musculoskeletal:      Cervical back: Full passive range of motion without pain and neck supple. No swelling, deformity, erythema, spasms, tenderness or bony tenderness. No pain with movement or spinous process tenderness. Normal range of motion.      Thoracic back: No swelling, deformity, spasms, tenderness or bony tenderness. Normal range of motion.      Lumbar back: No swelling, deformity, spasms, tenderness or bony tenderness. Normal range of motion. Negative right straight leg raise test and negative left straight leg raise test.        Back:       Comments: Patient able to bend greater than 90° at the waist and touch his toes without any discomfort.  Full range of motion of rotation, flexion, and extension of back, flexion, extension, abduction, adduction, internal rotation, and external rotation of bilateral shoulders with no discomfort.  No midline tenderness or bony step-offs throughout the patient's spine from occiput to sacrum.  No tenderness to palpation in the paraspinal muscles.     Neurological: He is alert and oriented to person, place, and time. He has normal strength. No cranial nerve deficit or sensory deficit. GCS eye subscore is 4. GCS verbal subscore is 5. GCS motor subscore is 6.   Skin: Skin is warm and dry. Capillary refill takes less than 2 seconds. No rash noted.   Psychiatric: His speech is normal and behavior is normal. Thought content normal. His  mood appears anxious.       ED Course   Procedures  Labs Reviewed - No data to display       Imaging Results              X-Ray Thoracic Spine AP Lateral (Final result)  Result time 11/22/22 00:17:09      Final result by Lauren Lambert MD (11/22/22 00:17:09)                   Impression:      No acute thoracic spine abnormalities identified.      Electronically signed by: Lauren Lambert MD  Date:    11/22/2022  Time:    00:17               Narrative:    EXAMINATION:  XR THORACIC SPINE AP LATERAL    CLINICAL HISTORY:  Pain in thoracic spine    TECHNIQUE:  AP and lateral views of the thoracic spine were performed.    COMPARISON:  None    FINDINGS:  Thoracic spine alignment appears within normal limits.  No evidence of acute thoracic spine fracture or subluxation.  Heart is normal in size.  Visualized lungs are clear.                                       Medications - No data to display  Medical Decision Making:   Initial Assessment:   32-year-old male re-presenting to the emergency department with chief complaint of thoracic back pain.  Differential Diagnosis:   Differential diagnosis includes but is not limited to musculoskeletal pain secondary to muscle strain, sprain, dislocation, or fracture, pulmonary embolism, acute coronary syndrome  Clinical Tests:   Radiological Study: Ordered and Reviewed  ED Management:  Patient presenting to the emergency department with chief complaint of thoracic back pain between his shoulder blades that has been present for 4-5 days.  He was seen in this emergency department 2 days ago for the same issue.  No known trauma, change in exercise routine, or other injury that may have caused this pain. He has had minimal relief with Robaxin and states to me that he is most concerned about finding out exactly what is going on.  He says that his pain is mild and manageable but he is worried that he does not know what is happening.  He states that he is concerned for bone cancer because he  has been losing weight, although he also reports daily exercise and dietary changes over the last 5 months.  On my exam, the patient appears anxious and is sitting in the bed, leaning forward, and wringing his hands slowly.  He is tachycardic.  Physical exam of the patient's back revealed overlying scratches, but no other abnormalities.  Full range of motion, no midline tenderness, no tenderness to palpation or muscle spasm in the paraspinal muscles.  None of the range-of-motion exercises produced any pain.  X-rays returned negative for any acute abnormalities.  It is still possible that this patient is having some musculoskeletal pain, and he still has plenty of naproxen and Robaxin at home from the last time he was seen.    Because this patient is reporting thoracic pain and is tachycardic, pulmonary embolism had be considered.  However, he has no signs or symptoms of DVT, musculoskeletal pain and anxiety are much more likely diagnoses than pulmonary embolism, he has no recent immobilization, no hormone replacement, no hemoptysis, and no history of DVT or PE.  His Wells score is 1.5, which makes him low risk for pulmonary embolism. He is tachycardic but is saturating 100% on room air and is not describing chest pain or shortness of breath. Symptoms were also slow onset over days. He had a negative D-dimer 2 days ago, reports no change in his symptoms, and stated that he does not wish to have any blood work done today. Given these findings, no further PE workup will be conducted.  Further, I have no suspicion for ACS is this patient does not report any chest pain and has a history that is inconsistent ACS.    Had a lengthy discussion with the patient about signs and symptoms of anxiety and how some of his behaviors and worries were consistent with generalized anxiety.  We also discussed the importance of having a primary care physician to follow with.  He reports that after his last visit to the emergency  department he set up an appointment with a primary care provider to establish care.  His appointment is scheduled for Monday November 28, 2022.  After our discussion tonight, the patient reports that he will ask his new primary care provider about anxiety.  I also referred the patient to psychology in case he would like to make an appointment with them.    Return precautions were discussed, all patient questions were answered, and he was agreeable to the plan of care.  The patient was discharged home in stable condition.  Additional MDM:     Well's Criteria Score:  -Clinical symptoms of DVT (leg swelling, pain with palpation) = 0.0  -Other diagnosis less likely than pulmonary embolism =            0.0  -Heart Rate >100 =   1.5  -Immobilization (= or > than 3 days) or surgery in the previous 4 weeks = 0.0  -Previous DVT/PE = 0.0  -Hemoptysis =          0.0  -Malignancy =           0.0  Well's Probability Score =    1.5                         Clinical Impression:   Final diagnoses:  [F41.8] Anxiety about health  [M54.6] Bilateral thoracic back pain, unspecified chronicity (Primary)        ED Disposition Condition    Discharge Stable          ED Prescriptions    None       Follow-up Information       Follow up With Specialties Details Why Contact Info    [facility]  Schedule an appointment as soon as possible for a visit in 2 days To establish care [address] [place] [address]  [phone]               Jaya Miguel PA-C  11/22/22 0043

## 2022-11-22 NOTE — DISCHARGE INSTRUCTIONS
You can continue to take the previously prescribed naproxen and muscle relaxers for your back pain.    Please speak with your primary care provider about some of these worried thoughts and anxiety. I have also referred due to Psychology if you would like to make an appointment with them.  They will be calling you about making an appointment.    Thank you for coming to our Emergency Department today. It is important to remember that some problems are difficult to diagnose and may not be found during your first visit. Be sure to follow up with your primary care doctor and review any labs/imaging that was performed with them. If you do not have a primary care doctor, you may contact the one listed on your discharge paperwork or you may also call the [facility] Appointment Desk at [phone] to schedule an appointment with one.     All medications may potentially have side effects and it is impossible to predict which medications may give you side effects. If you feel that you are having a negative effect of any medication you should immediately stop taking them and seek medical attention.    Return to the ER with any questions/concerns, new/concerning symptoms, worsening or failure to improve. Do not drive or make any important decisions for 24 hours if you have received any pain medications, sedatives or mood altering drugs during your ER visit.

## 2022-11-22 NOTE — ED TRIAGE NOTES
33 yo male presents to ED with c/o back pain x 4/5 days. Pt reports that he was seen in the ED 2 days ago, was prescribed pain meds but does not want to take them because he wants to know the cause of the pain. Pt rates pain at an 8 on a PRS of 0-10.

## 2022-11-23 ENCOUNTER — OFFICE VISIT (OUTPATIENT)
Dept: INTERNAL MEDICINE | Facility: CLINIC | Age: 32
End: 2022-11-23
Payer: MEDICAID

## 2022-11-23 VITALS
HEIGHT: 68 IN | HEART RATE: 77 BPM | BODY MASS INDEX: 26.16 KG/M2 | SYSTOLIC BLOOD PRESSURE: 136 MMHG | WEIGHT: 172.63 LBS | TEMPERATURE: 98 F | DIASTOLIC BLOOD PRESSURE: 83 MMHG

## 2022-11-23 DIAGNOSIS — R63.4 WEIGHT LOSS, UNINTENTIONAL: ICD-10-CM

## 2022-11-23 DIAGNOSIS — Z71.6 ENCOUNTER FOR TOBACCO USE CESSATION COUNSELING: ICD-10-CM

## 2022-11-23 DIAGNOSIS — Z72.0 TOBACCO USE: ICD-10-CM

## 2022-11-23 DIAGNOSIS — D64.9 LOW HEMATOCRIT: ICD-10-CM

## 2022-11-23 DIAGNOSIS — M54.6 ACUTE BILATERAL THORACIC BACK PAIN: ICD-10-CM

## 2022-11-23 PROCEDURE — 3075F PR MOST RECENT SYSTOLIC BLOOD PRESS GE 130-139MM HG: ICD-10-PCS | Mod: CPTII,S$GLB,, | Performed by: INTERNAL MEDICINE

## 2022-11-23 PROCEDURE — 3008F PR BODY MASS INDEX (BMI) DOCUMENTED: ICD-10-PCS | Mod: CPTII,S$GLB,, | Performed by: INTERNAL MEDICINE

## 2022-11-23 PROCEDURE — 1160F PR REVIEW ALL MEDS BY PRESCRIBER/CLIN PHARMACIST DOCUMENTED: ICD-10-PCS | Mod: CPTII,S$GLB,, | Performed by: INTERNAL MEDICINE

## 2022-11-23 PROCEDURE — 3075F SYST BP GE 130 - 139MM HG: CPT | Mod: CPTII,S$GLB,, | Performed by: INTERNAL MEDICINE

## 2022-11-23 PROCEDURE — 1159F MED LIST DOCD IN RCRD: CPT | Mod: CPTII,S$GLB,, | Performed by: INTERNAL MEDICINE

## 2022-11-23 PROCEDURE — 99204 OFFICE O/P NEW MOD 45 MIN: CPT | Mod: S$GLB,,, | Performed by: INTERNAL MEDICINE

## 2022-11-23 PROCEDURE — 1159F PR MEDICATION LIST DOCUMENTED IN MEDICAL RECORD: ICD-10-PCS | Mod: CPTII,S$GLB,, | Performed by: INTERNAL MEDICINE

## 2022-11-23 PROCEDURE — 3008F BODY MASS INDEX DOCD: CPT | Mod: CPTII,S$GLB,, | Performed by: INTERNAL MEDICINE

## 2022-11-23 PROCEDURE — 1160F RVW MEDS BY RX/DR IN RCRD: CPT | Mod: CPTII,S$GLB,, | Performed by: INTERNAL MEDICINE

## 2022-11-23 PROCEDURE — 3079F DIAST BP 80-89 MM HG: CPT | Mod: CPTII,S$GLB,, | Performed by: INTERNAL MEDICINE

## 2022-11-23 PROCEDURE — 3079F PR MOST RECENT DIASTOLIC BLOOD PRESSURE 80-89 MM HG: ICD-10-PCS | Mod: CPTII,S$GLB,, | Performed by: INTERNAL MEDICINE

## 2022-11-23 PROCEDURE — 99204 PR OFFICE/OUTPT VISIT, NEW, LEVL IV, 45-59 MIN: ICD-10-PCS | Mod: S$GLB,,, | Performed by: INTERNAL MEDICINE

## 2022-11-23 NOTE — PROGRESS NOTES
Chief C/o:    Establish Care, Back Pain (Upper back pain, started about 5 days ago, pain is getting worse. Went to ER gave him meds, took meds not helping. ), and Weight Loss (Pt started dieting and walking daily in July and weight started falling off.  In July of this year his weight was 215 lbs.)        Health Care Maintenance    Health Maintenance         Date Due Completion Date    Hepatitis C Screening Never done ---    Lipid Panel Never done ---    COVID-19 Vaccine (1) Never done ---    Pneumococcal Vaccines (Age 0-64) (1 - PCV) Never done ---    HIV Screening Never done ---    TETANUS VACCINE Never done ---    Influenza Vaccine (1) 09/01/2022 2/20/2013                   HISTORY OF PRESENT ILLNESS:    SAMINA Yoo is a 32 y.o. male who presents to the clinic today for Establish Care, Back Pain (Upper back pain, started about 5 days ago, pain is getting worse. Went to ER gave him meds, took meds not helping. ), and Weight Loss (Pt started dieting and walking daily in July and weight started falling off.  In July of this year his weight was 215 lbs.)  .  Patient indicated that he lost significant amount of weight over the past 5 months, however at that time he started exercising and started with dieting, however he is worried as he feels that he lost more weight than he was expecting.    He has pain in between his shoulder blades that started about 5 or 6 days ago, he went to the emergency room twice for that, he had an x-ray of the thoracic spines which was essentially unremarkable.  He has no history of trauma or fall, he was given musculoskeletal and anti-inflammatory medicine that he did not take.          Thoracic spine, FINDINGS:  Thoracic spine alignment appears within normal limits.  No evidence of acute thoracic spine fracture or subluxation.  Heart is normal in size.  Visualized lungs are clear.     Impression:     No acute thoracic spine abnormalities identified.           ALLERGIES AND  MEDICATIONS: updated and reviewed.  Review of patient's allergies indicates:  No Known Allergies  Medication List with Changes/Refills   Current Medications    METHOCARBAMOL (ROBAXIN) 500 MG TAB    Take 1 tablet (500 mg total) by mouth 3 (three) times daily. for 10 days    NAPROXEN (NAPROSYN) 500 MG TABLET    Take 1 tablet (500 mg total) by mouth 2 (two) times daily as needed (Pain). Take With Meals   Discontinued Medications    BENZOCAINE (ORAJEL) 10 % MUCOSAL GEL    Use as directed in the mouth or throat 3 (three) times daily as needed for Pain.    BUPRENORPHINE HCL (SUBUTEX) 8 MG SUBL    Place 4 mg under the tongue 4 (four) times daily as needed.    DM/P-EPHED/ACETAMINOPH/DOXYLAM (NYQUIL ORAL)    Take 1 tablet by mouth daily as needed.    IBUPROFEN (ADVIL,MOTRIN) 200 MG TABLET    Take 400 mg by mouth every 6 (six) hours as needed for Pain.    TRAMADOL (ULTRAM) 50 MG TABLET    Take 50 mg by mouth every 6 (six) hours as needed.       Problem List:  Patient Active Problem List   Diagnosis    Pilonidal cyst    BMI 26.0-26.9,adult    Acute bilateral thoracic back pain    Weight loss, unintentional    Low hematocrit    Tobacco use    Encounter for tobacco use cessation counseling         CARE TEAM:    Patient Care Team:  Humaira Perez MD as PCP - General (Internal Medicine)         REVIEW OF SYSTEMS:    Review of Systems   Constitutional:  Positive for unexpected weight change. Negative for appetite change, chills, diaphoresis, fatigue and fever.   HENT:  Negative for congestion, drooling, ear discharge, ear pain, facial swelling, hearing loss, nosebleeds, rhinorrhea, sinus pain, sneezing, sore throat, tinnitus, trouble swallowing and voice change.    Eyes:  Negative for pain, discharge, redness, itching and visual disturbance.   Respiratory:  Negative for cough, choking, chest tightness, shortness of breath, wheezing and stridor.    Cardiovascular:  Negative for chest pain, palpitations and leg swelling.  "  Gastrointestinal:  Negative for abdominal distention, abdominal pain, blood in stool, constipation, diarrhea, nausea and vomiting.   Endocrine: Negative for cold intolerance, heat intolerance, polydipsia, polyphagia and polyuria.   Genitourinary:  Negative for difficulty urinating, dysuria, flank pain, frequency, hematuria and urgency.   Musculoskeletal:  Positive for back pain. Negative for arthralgias, gait problem, joint swelling, myalgias, neck pain and neck stiffness.   Skin:  Negative for color change, pallor, rash and wound.   Allergic/Immunologic: Negative for environmental allergies, food allergies and immunocompromised state.   Neurological:  Negative for dizziness, tremors, seizures, syncope, speech difficulty, weakness, light-headedness, numbness and headaches.   Hematological:  Negative for adenopathy. Does not bruise/bleed easily.   Psychiatric/Behavioral:  Negative for agitation, behavioral problems, confusion, decreased concentration, dysphoric mood, hallucinations, sleep disturbance and suicidal ideas. The patient is not nervous/anxious.        PHYSICAL EXAM:    Vitals:    11/23/22 0953   BP: 136/83   Pulse: 77   Temp: 97.9 °F (36.6 °C)     Weight: 78.3 kg (172 lb 9.9 oz)   Height: 5' 8" (172.7 cm)   Body mass index is 26.25 kg/m².  Vitals:    11/23/22 0953   BP: 136/83   Pulse: 77   Temp: 97.9 °F (36.6 °C)   TempSrc: Temporal   Weight: 78.3 kg (172 lb 9.9 oz)   Height: 5' 8" (1.727 m)   PainSc:   6          Physical Exam  Vitals and nursing note reviewed.   Constitutional:       General: He is not in acute distress.     Appearance: He is not ill-appearing, toxic-appearing or diaphoretic.      Comments: Patient is alert, awake and oriented X 3.  Patient is comfortable, cooperative and in no apparent distress.  Overweight   HENT:      Head: Normocephalic and atraumatic.      Right Ear: Tympanic membrane, ear canal and external ear normal. There is no impacted cerumen.      Left Ear: Tympanic " membrane, ear canal and external ear normal. There is no impacted cerumen.      Nose: Nose normal. No congestion or rhinorrhea.      Mouth/Throat:      Mouth: Mucous membranes are moist.      Pharynx: Oropharynx is clear. No oropharyngeal exudate or posterior oropharyngeal erythema.   Eyes:      General: No scleral icterus.        Right eye: No discharge.         Left eye: No discharge.      Extraocular Movements: Extraocular movements intact.      Conjunctiva/sclera: Conjunctivae normal.      Pupils: Pupils are equal, round, and reactive to light.   Cardiovascular:      Rate and Rhythm: Normal rate and regular rhythm.      Pulses: Normal pulses.      Heart sounds: Normal heart sounds. No murmur heard.    No friction rub. No gallop.   Pulmonary:      Effort: Pulmonary effort is normal. No respiratory distress.      Breath sounds: Normal breath sounds. No stridor. No wheezing, rhonchi or rales.   Chest:      Chest wall: No tenderness.   Abdominal:      General: Bowel sounds are normal. There is no distension.      Palpations: Abdomen is soft. There is no mass.      Tenderness: There is no abdominal tenderness. There is no guarding or rebound.      Hernia: No hernia is present.   Musculoskeletal:         General: Tenderness (Tenderness on palpating the paraspinal muscles at interscapular level) present. No swelling, deformity or signs of injury. Normal range of motion.      Cervical back: Normal range of motion and neck supple. No rigidity.      Right lower leg: No edema.      Left lower leg: No edema.   Lymphadenopathy:      Cervical: No cervical adenopathy.   Skin:     General: Skin is warm and dry.      Capillary Refill: Capillary refill takes less than 2 seconds.      Coloration: Skin is not jaundiced or pale.      Findings: No bruising, erythema, lesion or rash.   Neurological:      General: No focal deficit present.      Mental Status: He is alert and oriented to person, place, and time.      Cranial Nerves: No  cranial nerve deficit.      Sensory: No sensory deficit.      Motor: No weakness.      Coordination: Coordination normal.      Deep Tendon Reflexes: Reflexes normal.   Psychiatric:         Mood and Affect: Mood normal.         Behavior: Behavior normal.         Thought Content: Thought content normal.         Judgment: Judgment normal.          Labs:    Lab Results   Component Value Date     11/19/2022     11/19/2022    K 3.8 11/19/2022     11/19/2022    CO2 24 11/19/2022    BUN 16 11/19/2022    CREATININE 1.0 11/19/2022    CALCIUM 9.7 11/19/2022    PROT 8.5 (H) 11/19/2022    ALBUMIN 4.9 11/19/2022    BILITOT 0.4 11/19/2022    ALKPHOS <5 (L) 11/19/2022    AST 20 11/19/2022    ALT 14 11/19/2022    ANIONGAP 13 11/19/2022    ESTGFRAFRICA >60 09/21/2013    EGFRNONAA >60 09/21/2013     Lab Results   Component Value Date    WBC 10.32 11/19/2022    RBC 4.59 (L) 11/19/2022    HGB 14.0 11/19/2022    HCT 39.8 (L) 11/19/2022    MCV 87 11/19/2022    RDW 11.8 11/19/2022     11/19/2022      No results found for: CHOL, TRIG, HDL, LDLCALC, TOTALCHOLEST  No results found for: TSH  No results found for: HGBA1C, ESTIMATEDAVG   No components found for: MICROALBUMIN/CREATININE    ASSESSMENT & PLAN:    1. Acute bilateral thoracic back pain    2. Weight loss, unintentional    3. Low hematocrit    4. BMI 26.0-26.9,adult    5. Tobacco use    6. Encounter for tobacco use cessation counseling     Patient presenting with upper back pain which is musculoskeletal, with tenderness over the paraspinal muscles in the interscapular area, he was advised to take naproxen and Robaxin as prescribed in the emergency room and observe.    He has significant weight loss however that was associated with starting dieting and exercising, at any rate will order a comprehensive blood test including CBC, CMP, serum iron and ferritin, TSH and free T4, vitamin-D and vitamin B12 levels, and patient was advised to come for follow-up after  these test done, and he was counseled and encouraged to quit smoking.      No orders of the defined types were placed in this encounter.     Follow up in about 2 weeks (around 12/7/2022), or if symptoms worsen or fail to improve. or sooner as needed.    Patient was counseled and questions and concerns were addressed.    Please note:  Parts of this report were done using a dictation software, voice to text, and sometimes the text contains some uncorrected words or sentences that are missed during revision.

## 2022-11-26 LAB
25(OH)D3+25(OH)D2 SERPL-MCNC: 34.7 NG/ML (ref 30–100)
ALBUMIN SERPL-MCNC: 4.9 G/DL (ref 4–5)
ALBUMIN/GLOB SERPL: 1.9 {RATIO} (ref 1.2–2.2)
ALP SERPL-CCNC: 46 IU/L (ref 44–121)
ALT SERPL-CCNC: 11 IU/L (ref 0–44)
AST SERPL-CCNC: 13 IU/L (ref 0–40)
BILIRUB SERPL-MCNC: 0.4 MG/DL (ref 0–1.2)
BUN SERPL-MCNC: 14 MG/DL (ref 6–20)
BUN/CREAT SERPL: 17 (ref 9–20)
CALCIUM SERPL-MCNC: 10.2 MG/DL (ref 8.7–10.2)
CHLORIDE SERPL-SCNC: 100 MMOL/L (ref 96–106)
CHOLEST SERPL-MCNC: 131 MG/DL (ref 100–199)
CO2 SERPL-SCNC: 24 MMOL/L (ref 20–29)
CREAT SERPL-MCNC: 0.81 MG/DL (ref 0.76–1.27)
ERYTHROCYTE [DISTWIDTH] IN BLOOD BY AUTOMATED COUNT: 12 % (ref 11.6–15.4)
ERYTHROCYTE [SEDIMENTATION RATE] IN BLOOD BY WESTERGREN METHOD: 9 MM/HR (ref 0–15)
EST. GFR  (NO RACE VARIABLE): 119 ML/MIN/1.73
FOLATE SERPL-MCNC: 9.7 NG/ML
GLOBULIN SER CALC-MCNC: 2.6 G/DL (ref 1.5–4.5)
GLUCOSE SERPL-MCNC: 103 MG/DL (ref 70–99)
HCT VFR BLD AUTO: 41 % (ref 37.5–51)
HCV AB S/CO SERPL IA: <0.1 S/CO RATIO (ref 0–0.9)
HDLC SERPL-MCNC: 51 MG/DL
HGB BLD-MCNC: 14.2 G/DL (ref 13–17.7)
HIV 1+2 AB+HIV1 P24 AG SERPL QL IA: NON REACTIVE
IMP & REVIEW OF LAB RESULTS: NORMAL
IRON SERPL-MCNC: 117 UG/DL (ref 38–169)
LDLC SERPL CALC-MCNC: 70 MG/DL (ref 0–99)
MCH RBC QN AUTO: 30.5 PG (ref 26.6–33)
MCHC RBC AUTO-ENTMCNC: 34.6 G/DL (ref 31.5–35.7)
MCV RBC AUTO: 88 FL (ref 79–97)
PLATELET # BLD AUTO: 274 X10E3/UL (ref 150–450)
POTASSIUM SERPL-SCNC: 4 MMOL/L (ref 3.5–5.2)
PROT SERPL-MCNC: 7.5 G/DL (ref 6–8.5)
RBC # BLD AUTO: 4.65 X10E6/UL (ref 4.14–5.8)
SODIUM SERPL-SCNC: 139 MMOL/L (ref 134–144)
TRANSFERRIN SERPL-MCNC: 240 MG/DL (ref 177–329)
TRIGL SERPL-MCNC: 41 MG/DL (ref 0–149)
TSH SERPL DL<=0.005 MIU/L-ACNC: 2.29 UIU/ML (ref 0.45–4.5)
VIT B12 SERPL-MCNC: 1173 PG/ML (ref 232–1245)
VLDLC SERPL CALC-MCNC: 10 MG/DL (ref 5–40)
WBC # BLD AUTO: 6.9 X10E3/UL (ref 3.4–10.8)

## 2022-11-29 ENCOUNTER — OFFICE VISIT (OUTPATIENT)
Dept: INTERNAL MEDICINE | Facility: CLINIC | Age: 32
End: 2022-11-29
Payer: MEDICAID

## 2022-11-29 VITALS
WEIGHT: 170.63 LBS | DIASTOLIC BLOOD PRESSURE: 73 MMHG | HEIGHT: 68 IN | TEMPERATURE: 98 F | HEART RATE: 91 BPM | BODY MASS INDEX: 25.86 KG/M2 | SYSTOLIC BLOOD PRESSURE: 126 MMHG

## 2022-11-29 DIAGNOSIS — M79.604 LEG PAIN, BILATERAL: ICD-10-CM

## 2022-11-29 DIAGNOSIS — M79.605 LEG PAIN, BILATERAL: ICD-10-CM

## 2022-11-29 DIAGNOSIS — Z71.6 ENCOUNTER FOR TOBACCO USE CESSATION COUNSELING: ICD-10-CM

## 2022-11-29 DIAGNOSIS — M54.6 ACUTE BILATERAL THORACIC BACK PAIN: ICD-10-CM

## 2022-11-29 DIAGNOSIS — Z00.00 ROUTINE GENERAL MEDICAL EXAMINATION AT A HEALTH CARE FACILITY: Primary | ICD-10-CM

## 2022-11-29 DIAGNOSIS — F43.23 ADJUSTMENT DISORDER WITH MIXED ANXIETY AND DEPRESSED MOOD: ICD-10-CM

## 2022-11-29 DIAGNOSIS — R73.9 HYPERGLYCEMIA: ICD-10-CM

## 2022-11-29 DIAGNOSIS — D64.9 LOW HEMATOCRIT: ICD-10-CM

## 2022-11-29 DIAGNOSIS — Z72.0 TOBACCO USE: ICD-10-CM

## 2022-11-29 DIAGNOSIS — R63.4 WEIGHT LOSS, UNINTENTIONAL: ICD-10-CM

## 2022-11-29 LAB
BILIRUB SERPL-MCNC: NEGATIVE MG/DL
BLOOD URINE, POC: NEGATIVE
CLARITY, POC UA: CLEAR
COLOR, POC UA: YELLOW
GLUCOSE UR QL STRIP: NEGATIVE
KETONES UR QL STRIP: NEGATIVE
LEUKOCYTE ESTERASE URINE, POC: NEGATIVE
NITRITE, POC UA: NEGATIVE
PH, POC UA: 5
PROTEIN, POC: NEGATIVE
SPECIFIC GRAVITY, POC UA: 1.01
UROBILINOGEN, POC UA: NEGATIVE

## 2022-11-29 PROCEDURE — 3074F SYST BP LT 130 MM HG: CPT | Mod: CPTII,S$GLB,, | Performed by: INTERNAL MEDICINE

## 2022-11-29 PROCEDURE — 3008F PR BODY MASS INDEX (BMI) DOCUMENTED: ICD-10-PCS | Mod: CPTII,S$GLB,, | Performed by: INTERNAL MEDICINE

## 2022-11-29 PROCEDURE — 81002 POCT URINE DIPSTICK WITHOUT MICROSCOPE: ICD-10-PCS | Mod: S$GLB,,, | Performed by: INTERNAL MEDICINE

## 2022-11-29 PROCEDURE — 1159F MED LIST DOCD IN RCRD: CPT | Mod: CPTII,S$GLB,, | Performed by: INTERNAL MEDICINE

## 2022-11-29 PROCEDURE — 1159F PR MEDICATION LIST DOCUMENTED IN MEDICAL RECORD: ICD-10-PCS | Mod: CPTII,S$GLB,, | Performed by: INTERNAL MEDICINE

## 2022-11-29 PROCEDURE — 99395 PR PREVENTIVE VISIT,EST,18-39: ICD-10-PCS | Mod: S$GLB,,, | Performed by: INTERNAL MEDICINE

## 2022-11-29 PROCEDURE — 3078F DIAST BP <80 MM HG: CPT | Mod: CPTII,S$GLB,, | Performed by: INTERNAL MEDICINE

## 2022-11-29 PROCEDURE — 3008F BODY MASS INDEX DOCD: CPT | Mod: CPTII,S$GLB,, | Performed by: INTERNAL MEDICINE

## 2022-11-29 PROCEDURE — 81002 URINALYSIS NONAUTO W/O SCOPE: CPT | Mod: S$GLB,,, | Performed by: INTERNAL MEDICINE

## 2022-11-29 PROCEDURE — 1160F RVW MEDS BY RX/DR IN RCRD: CPT | Mod: CPTII,S$GLB,, | Performed by: INTERNAL MEDICINE

## 2022-11-29 PROCEDURE — 99395 PREV VISIT EST AGE 18-39: CPT | Mod: S$GLB,,, | Performed by: INTERNAL MEDICINE

## 2022-11-29 PROCEDURE — 3078F PR MOST RECENT DIASTOLIC BLOOD PRESSURE < 80 MM HG: ICD-10-PCS | Mod: CPTII,S$GLB,, | Performed by: INTERNAL MEDICINE

## 2022-11-29 PROCEDURE — 3074F PR MOST RECENT SYSTOLIC BLOOD PRESSURE < 130 MM HG: ICD-10-PCS | Mod: CPTII,S$GLB,, | Performed by: INTERNAL MEDICINE

## 2022-11-29 PROCEDURE — 1160F PR REVIEW ALL MEDS BY PRESCRIBER/CLIN PHARMACIST DOCUMENTED: ICD-10-PCS | Mod: CPTII,S$GLB,, | Performed by: INTERNAL MEDICINE

## 2022-11-29 RX ORDER — ESCITALOPRAM OXALATE 5 MG/1
5 TABLET ORAL DAILY
Qty: 30 TABLET | Refills: 2 | Status: SHIPPED | OUTPATIENT
Start: 2022-11-29 | End: 2023-02-28

## 2022-11-29 RX ORDER — IBUPROFEN 800 MG/1
800 TABLET ORAL
Qty: 100 TABLET | Refills: 1 | Status: SHIPPED | OUTPATIENT
Start: 2022-11-29 | End: 2023-02-28 | Stop reason: SDUPTHER

## 2022-11-29 NOTE — PROGRESS NOTES
"Chief C/o:    Follow-up (Lab results check.) and Back Pain (Pt. c/o pain in upper to mid back radiating down to (B) legs x 2 weeks)        Health Care Maintenance    Health Maintenance    Patient has no pending health maintenance at this time                HISTORY OF PRESENT ILLNESS:    SAMINA Yoo is a 32 y.o. male who presents to the clinic today for Follow-up (Lab results check.) and Back Pain (Pt. c/o pain in upper to mid back radiating down to (B) legs x 2 weeks)  .                   ALLERGIES AND MEDICATIONS: updated and reviewed.  Review of patient's allergies indicates:  No Known Allergies  Medication List with Changes/Refills   New Medications    ESCITALOPRAM OXALATE (LEXAPRO) 5 MG TAB    Take 1 tablet (5 mg total) by mouth once daily.    IBUPROFEN (ADVIL,MOTRIN) 800 MG TABLET    Take 1 tablet (800 mg total) by mouth after meals as needed for Pain (Joint and muscle pain and headache).   Current Medications    METHOCARBAMOL (ROBAXIN) 500 MG TAB    Take 1 tablet (500 mg total) by mouth 3 (three) times daily. for 10 days   Discontinued Medications    NAPROXEN (NAPROSYN) 500 MG TABLET    Take 1 tablet (500 mg total) by mouth 2 (two) times daily as needed (Pain). Take With Meals       Problem List:  Patient Active Problem List   Diagnosis    Pilonidal cyst    BMI 25.0-25.9,adult    Acute bilateral thoracic back pain    Weight loss, unintentional    Low hematocrit    Tobacco use    Encounter for tobacco use cessation counseling    Hyperglycemia    Adjustment disorder with mixed anxiety and depressed mood         CARE TEAM:    Patient Care Team:  Humaira Perez MD as PCP - General (Internal Medicine)         REVIEW OF SYSTEMS:    Review of Systems      PHYSICAL EXAM:    Vitals:    11/29/22 0937   BP: 126/73   Pulse: 91   Temp: 97.6 °F (36.4 °C)     Weight: 77.4 kg (170 lb 10.2 oz)   Height: 5' 8" (172.7 cm)   Body mass index is 25.95 kg/m².  Vitals:    11/29/22 0937   BP: 126/73   Pulse: 91   Temp: " "97.6 °F (36.4 °C)   TempSrc: Temporal   Weight: 77.4 kg (170 lb 10.2 oz)   Height: 5' 8" (1.727 m)   PainSc:   8   PainLoc: Back          Physical Exam  Vitals and nursing note reviewed.   Constitutional:       General: He is not in acute distress.     Appearance: He is not ill-appearing, toxic-appearing or diaphoretic.      Comments: Patient is alert, awake and oriented X 3.  Patient is comfortable, cooperative and in no apparent distress.  Overweight   HENT:      Head: Normocephalic and atraumatic.      Right Ear: Tympanic membrane, ear canal and external ear normal. There is no impacted cerumen.      Left Ear: Tympanic membrane, ear canal and external ear normal. There is no impacted cerumen.      Nose: Nose normal. No congestion or rhinorrhea.      Mouth/Throat:      Mouth: Mucous membranes are moist.      Pharynx: Oropharynx is clear. No oropharyngeal exudate or posterior oropharyngeal erythema.   Eyes:      General: No scleral icterus.        Right eye: No discharge.         Left eye: No discharge.      Extraocular Movements: Extraocular movements intact.      Conjunctiva/sclera: Conjunctivae normal.      Pupils: Pupils are equal, round, and reactive to light.   Cardiovascular:      Rate and Rhythm: Normal rate and regular rhythm.      Pulses: Normal pulses.      Heart sounds: Normal heart sounds. No murmur heard.    No friction rub. No gallop.   Pulmonary:      Effort: Pulmonary effort is normal. No respiratory distress.      Breath sounds: Normal breath sounds. No stridor. No wheezing, rhonchi or rales.   Chest:      Chest wall: No tenderness.   Abdominal:      General: Bowel sounds are normal. There is no distension.      Palpations: Abdomen is soft. There is no mass.      Tenderness: There is no abdominal tenderness. There is no guarding or rebound.      Hernia: No hernia is present.   Musculoskeletal:         General: Tenderness (Tenderness on palpating the paraspinal muscles at interscapular level) " present. No swelling, deformity or signs of injury. Normal range of motion.      Cervical back: Normal range of motion and neck supple. No rigidity.      Right lower leg: No edema.      Left lower leg: No edema.   Lymphadenopathy:      Cervical: No cervical adenopathy.   Skin:     General: Skin is warm and dry.      Capillary Refill: Capillary refill takes less than 2 seconds.      Coloration: Skin is not jaundiced or pale.      Findings: No bruising, erythema, lesion or rash.   Neurological:      General: No focal deficit present.      Mental Status: He is alert and oriented to person, place, and time.      Cranial Nerves: No cranial nerve deficit.      Sensory: No sensory deficit.      Motor: No weakness.      Coordination: Coordination normal.      Deep Tendon Reflexes: Reflexes normal.   Psychiatric:         Mood and Affect: Mood normal.         Behavior: Behavior normal.         Thought Content: Thought content normal.         Judgment: Judgment normal.      __________________________________________________________  Questionnaires to be scanned to the media section of patient's EHR records:    PHQ-9.  MIKE-7.    Safety at home.  -------------------------------  Past medical history, Family history, Social history and Allergies were reviewed.      Labs:    Lab Results   Component Value Date     (H) 11/25/2022     11/25/2022    K 4.0 11/25/2022     11/25/2022    CO2 24 11/25/2022    BUN 14 11/25/2022    CREATININE 0.81 11/25/2022    CALCIUM 10.2 11/25/2022    PROT 8.5 (H) 11/19/2022    ALBUMIN 4.9 11/25/2022    ALBUMIN 4.9 11/19/2022    BILITOT 0.4 11/25/2022    ALKPHOS <5 (L) 11/19/2022    AST 13 11/25/2022    ALT 11 11/25/2022    ANIONGAP 13 11/19/2022    ESTGFRAFRICA >60 09/21/2013    EGFRNONAA >60 09/21/2013     Lab Results   Component Value Date    WBC 6.9 11/25/2022    WBC 10.32 11/19/2022    RBC 4.65 11/25/2022    RBC 4.59 (L) 11/19/2022    HGB 14.2 11/25/2022    HGB 14.0 11/19/2022     HCT 41.0 11/25/2022    HCT 39.8 (L) 11/19/2022    MCV 88 11/25/2022    MCV 87 11/19/2022    RDW 12.0 11/25/2022    RDW 11.8 11/19/2022     11/25/2022     11/19/2022      Lab Results   Component Value Date    CHOL 131 11/25/2022    TRIG 41 11/25/2022    HDL 51 11/25/2022    LDLCALC 70 11/25/2022     Lab Results   Component Value Date    TSH 2.290 11/25/2022     No results found for: HGBA1C, ESTIMATEDAVG   No components found for: MICROALBUMIN/CREATININE    ASSESSMENT & PLAN:    1. Routine general medical examination at a health care facility    2. Hyperglycemia  - Basic Metabolic Panel; Future  - Hemoglobin A1C; Future  - Basic Metabolic Panel  - Hemoglobin A1C  Blood sugar is in the range of prediabetes.  In simple words, blood sugar is elevated more than the upper limit of normal, and less than that of Diabetes Mellitus.   Healthy diet, exercise and weight management are essential, to prevent or decrease chances of progression to f clinical Diabetes Mellitus.    3. Low hematocrit  Hemoglobin hematocrit were normal this time as well as iron studies.  4. Leg pain, bilateral  - ibuprofen (ADVIL,MOTRIN) 800 MG tablet; Take 1 tablet (800 mg total) by mouth after meals as needed for Pain (Joint and muscle pain and headache).  Dispense: 100 tablet; Refill: 1    5. Acute bilateral thoracic back pain  - ibuprofen (ADVIL,MOTRIN) 800 MG tablet; Take 1 tablet (800 mg total) by mouth after meals as needed for Pain (Joint and muscle pain and headache).  Dispense: 100 tablet; Refill: 1  - POCT URINE DIPSTICK WITHOUT MICROSCOPE    6. Adjustment disorder with mixed anxiety and depressed mood  - EScitalopram oxalate (LEXAPRO) 5 MG Tab; Take 1 tablet (5 mg total) by mouth once daily.  Dispense: 30 tablet; Refill: 2  Patient has symptoms consistent with anxiety and depression and mostly because of situational stressors, Lexapro was started at low dose and will monitor the patient couple of weeks.  Will consider  increasing the dose if necessary  7. Weight loss, unintentional  There is no cause for the patient to lose weight on basis of physical examination and available labs, advised to observe, the reason is most probably anxiety and depression, will see how the patient does after starting Lexapro.  If he continues to lose weight will refer him for further evaluation and 2nd opinion.  8. BMI 25.0-25.9,adult  Healthy diet and exercise are recommended  9. Tobacco use  10. Encounter for tobacco use cessation counseling   That might also help in stopping the weight loss if he quit smoking.        Orders Placed This Encounter   Procedures    Basic Metabolic Panel    Hemoglobin A1C    POCT URINE DIPSTICK WITHOUT MICROSCOPE        Follow up in about 2 weeks (around 12/13/2022), or if symptoms worsen or fail to improve. or sooner as needed.    Patient was counseled and questions and concerns were addressed.    Please note:  Parts of this report were done using a dictation software, voice to text, and sometimes the text contains some uncorrected words or sentences that are missed during revision.

## 2023-02-28 ENCOUNTER — OFFICE VISIT (OUTPATIENT)
Dept: INTERNAL MEDICINE | Facility: CLINIC | Age: 33
End: 2023-02-28
Payer: MEDICAID

## 2023-02-28 VITALS
TEMPERATURE: 97 F | DIASTOLIC BLOOD PRESSURE: 69 MMHG | HEART RATE: 56 BPM | WEIGHT: 170.94 LBS | BODY MASS INDEX: 25.91 KG/M2 | HEIGHT: 68 IN | SYSTOLIC BLOOD PRESSURE: 107 MMHG

## 2023-02-28 DIAGNOSIS — M54.6 CHRONIC RIGHT-SIDED THORACIC BACK PAIN: ICD-10-CM

## 2023-02-28 DIAGNOSIS — F51.02 ADJUSTMENT INSOMNIA: ICD-10-CM

## 2023-02-28 DIAGNOSIS — F43.23 ADJUSTMENT DISORDER WITH MIXED ANXIETY AND DEPRESSED MOOD: ICD-10-CM

## 2023-02-28 DIAGNOSIS — R73.9 HYPERGLYCEMIA: ICD-10-CM

## 2023-02-28 DIAGNOSIS — G89.29 CHRONIC RIGHT-SIDED THORACIC BACK PAIN: ICD-10-CM

## 2023-02-28 DIAGNOSIS — Z72.0 TOBACCO USE: ICD-10-CM

## 2023-02-28 DIAGNOSIS — Z00.00 ROUTINE GENERAL MEDICAL EXAMINATION AT A HEALTH CARE FACILITY: Primary | ICD-10-CM

## 2023-02-28 DIAGNOSIS — Z71.6 ENCOUNTER FOR TOBACCO USE CESSATION COUNSELING: ICD-10-CM

## 2023-02-28 PROCEDURE — 1160F RVW MEDS BY RX/DR IN RCRD: CPT | Mod: CPTII,S$GLB,, | Performed by: INTERNAL MEDICINE

## 2023-02-28 PROCEDURE — 3008F BODY MASS INDEX DOCD: CPT | Mod: CPTII,S$GLB,, | Performed by: INTERNAL MEDICINE

## 2023-02-28 PROCEDURE — 99395 PREV VISIT EST AGE 18-39: CPT | Mod: S$GLB,,, | Performed by: INTERNAL MEDICINE

## 2023-02-28 PROCEDURE — 1160F PR REVIEW ALL MEDS BY PRESCRIBER/CLIN PHARMACIST DOCUMENTED: ICD-10-PCS | Mod: CPTII,S$GLB,, | Performed by: INTERNAL MEDICINE

## 2023-02-28 PROCEDURE — 3074F SYST BP LT 130 MM HG: CPT | Mod: CPTII,S$GLB,, | Performed by: INTERNAL MEDICINE

## 2023-02-28 PROCEDURE — 3078F DIAST BP <80 MM HG: CPT | Mod: CPTII,S$GLB,, | Performed by: INTERNAL MEDICINE

## 2023-02-28 PROCEDURE — 3078F PR MOST RECENT DIASTOLIC BLOOD PRESSURE < 80 MM HG: ICD-10-PCS | Mod: CPTII,S$GLB,, | Performed by: INTERNAL MEDICINE

## 2023-02-28 PROCEDURE — 3074F PR MOST RECENT SYSTOLIC BLOOD PRESSURE < 130 MM HG: ICD-10-PCS | Mod: CPTII,S$GLB,, | Performed by: INTERNAL MEDICINE

## 2023-02-28 PROCEDURE — 3008F PR BODY MASS INDEX (BMI) DOCUMENTED: ICD-10-PCS | Mod: CPTII,S$GLB,, | Performed by: INTERNAL MEDICINE

## 2023-02-28 PROCEDURE — 1159F MED LIST DOCD IN RCRD: CPT | Mod: CPTII,S$GLB,, | Performed by: INTERNAL MEDICINE

## 2023-02-28 PROCEDURE — 99395 PR PREVENTIVE VISIT,EST,18-39: ICD-10-PCS | Mod: S$GLB,,, | Performed by: INTERNAL MEDICINE

## 2023-02-28 PROCEDURE — 1159F PR MEDICATION LIST DOCUMENTED IN MEDICAL RECORD: ICD-10-PCS | Mod: CPTII,S$GLB,, | Performed by: INTERNAL MEDICINE

## 2023-02-28 RX ORDER — DULOXETIN HYDROCHLORIDE 30 MG/1
30 CAPSULE, DELAYED RELEASE ORAL EVERY MORNING
COMMUNITY
Start: 2023-02-11 | End: 2023-02-28

## 2023-02-28 RX ORDER — CLONAZEPAM 1 MG/1
2 TABLET ORAL
COMMUNITY
Start: 2023-02-13 | End: 2023-05-10

## 2023-02-28 RX ORDER — HYDROXYZINE HYDROCHLORIDE 50 MG/1
50 TABLET, FILM COATED ORAL
COMMUNITY
Start: 2023-02-11 | End: 2023-05-10

## 2023-02-28 RX ORDER — MELATONIN 5 MG
5 CAPSULE ORAL NIGHTLY PRN
Refills: 0
Start: 2023-02-28 | End: 2023-05-10

## 2023-02-28 RX ORDER — ST. JOHN'S WORT 300 MG
300 CAPSULE ORAL DAILY
Refills: 0
Start: 2023-02-28 | End: 2023-05-10

## 2023-02-28 RX ORDER — IBUPROFEN 800 MG/1
800 TABLET ORAL
Qty: 100 TABLET | Refills: 1 | Status: SHIPPED | OUTPATIENT
Start: 2023-02-28 | End: 2023-05-10

## 2023-02-28 RX ORDER — TIZANIDINE 4 MG/1
4 TABLET ORAL NIGHTLY PRN
Qty: 90 TABLET | Refills: 1 | Status: SHIPPED | OUTPATIENT
Start: 2023-02-28 | End: 2023-05-10

## 2023-02-28 RX ORDER — TIZANIDINE 4 MG/1
4 TABLET ORAL 3 TIMES DAILY
COMMUNITY
Start: 2022-12-02 | End: 2023-02-28 | Stop reason: SDUPTHER

## 2023-02-28 NOTE — PROGRESS NOTES
Chief C/o:    Back Pain (Pt. c/o pain in upper mid back)        Health Care Maintenance    Health Maintenance    Patient has no pending health maintenance at this time                HISTORY OF PRESENT ILLNESS:    SAMINA Yoo is a 33 y.o. male who presents to the clinic today for Back Pain (Pt. c/o pain in upper mid back)  .   Patient is having chronic pain in his upper mid back and to the right side, the pain is mild to moderate.  He is having this pain for several months, more than 3 months.  No history of fall or trauma.  Patient has anxiety and depression and insomnia, he saw a psychiatrist, he was prescribed clonazepam, however he is not very happy with the clonazepam, he is getting hydroxyzine as well he feels that is working for him fairly good.  He was prescribed Lexapro in the past however he did not like to take such medicine as he said.              ALLERGIES AND MEDICATIONS: updated and reviewed.  Review of patient's allergies indicates:  No Known Allergies  Medication List with Changes/Refills   New Medications    MELATONIN 5 MG CAP    Take 1 capsule (5 mg total) by mouth nightly as needed (INSOMNIA).    CYNDI'S WORT 300 MG CAP    Take 300 mg by mouth once daily.   Current Medications    CLONAZEPAM (KLONOPIN) 1 MG TABLET    Take 2 mg by mouth.    HYDROXYZINE (ATARAX) 50 MG TABLET    Take 50 mg by mouth.   Changed and/or Refilled Medications    Modified Medication Previous Medication    IBUPROFEN (ADVIL,MOTRIN) 800 MG TABLET ibuprofen (ADVIL,MOTRIN) 800 MG tablet       Take 1 tablet (800 mg total) by mouth after meals as needed for Pain (Joint and muscle pain and headache).    Take 1 tablet (800 mg total) by mouth after meals as needed for Pain (Joint and muscle pain and headache).    TIZANIDINE (ZANAFLEX) 4 MG TABLET tiZANidine (ZANAFLEX) 4 MG tablet       Take 1 tablet (4 mg total) by mouth nightly as needed (Muscle spasm).    Take 4 mg by mouth 3 (three) times daily.   Discontinued  Medications    DULOXETINE (CYMBALTA) 30 MG CAPSULE    Take 30 mg by mouth every morning.    ESCITALOPRAM OXALATE (LEXAPRO) 5 MG TAB    Take 1 tablet (5 mg total) by mouth once daily.       Problem List:  Patient Active Problem List   Diagnosis    Pilonidal cyst    BMI 26.0-26.9,adult    Chronic right-sided thoracic back pain    Low hematocrit    Tobacco use    Encounter for tobacco use cessation counseling    Hyperglycemia    Adjustment disorder with mixed anxiety and depressed mood    Adjustment insomnia         CARE TEAM:    Patient Care Team:  Humaira Perez MD as PCP - General (Internal Medicine)         REVIEW OF SYSTEMS:    Review of Systems   Constitutional:  Positive for unexpected weight change. Negative for appetite change, chills, diaphoresis, fatigue and fever.   HENT:  Negative for congestion, drooling, ear discharge, ear pain, facial swelling, hearing loss, nosebleeds, rhinorrhea, sinus pain, sneezing, sore throat, tinnitus, trouble swallowing and voice change.    Eyes:  Negative for pain, discharge, redness, itching and visual disturbance.   Respiratory:  Negative for cough, choking, chest tightness, shortness of breath, wheezing and stridor.    Cardiovascular:  Negative for chest pain, palpitations and leg swelling.   Gastrointestinal:  Negative for abdominal distention, abdominal pain, blood in stool, constipation, diarrhea, nausea and vomiting.   Endocrine: Negative for cold intolerance, heat intolerance, polydipsia, polyphagia and polyuria.   Genitourinary:  Negative for difficulty urinating, dysuria, flank pain, frequency, hematuria and urgency.   Musculoskeletal:  Positive for back pain (Thoracic back pain). Negative for arthralgias, gait problem, joint swelling, myalgias, neck pain and neck stiffness.   Skin:  Negative for color change, pallor, rash and wound.   Allergic/Immunologic: Negative for environmental allergies, food allergies and immunocompromised state.   Neurological:   "Negative for dizziness, tremors, seizures, syncope, speech difficulty, weakness, light-headedness, numbness and headaches.   Hematological:  Negative for adenopathy. Does not bruise/bleed easily.   Psychiatric/Behavioral:  Positive for dysphoric mood and sleep disturbance. Negative for agitation, behavioral problems, confusion, decreased concentration, hallucinations and suicidal ideas. The patient is nervous/anxious.        PHYSICAL EXAM:    Vitals:    02/28/23 1007   BP: 107/69   Pulse: (!) 56   Temp: 97.4 °F (36.3 °C)     Weight: 77.6 kg (170 lb 15.5 oz)   Height: 5' 8" (172.7 cm)   Body mass index is 26 kg/m².  Vitals:    02/28/23 1007   BP: 107/69   Pulse: (!) 56   Temp: 97.4 °F (36.3 °C)   TempSrc: Temporal   Weight: 77.6 kg (170 lb 15.5 oz)   Height: 5' 8" (1.727 m)   PainSc: 0-No pain          Physical Exam  Vitals and nursing note reviewed.   Constitutional:       General: He is not in acute distress.     Appearance: He is not ill-appearing, toxic-appearing or diaphoretic.      Comments: Patient is alert, awake and oriented X 3.  Patient is comfortable, cooperative and in no apparent distress.  Overweight   HENT:      Head: Normocephalic and atraumatic.      Right Ear: Tympanic membrane, ear canal and external ear normal. There is no impacted cerumen.      Left Ear: Tympanic membrane, ear canal and external ear normal. There is no impacted cerumen.      Nose: Nose normal. No congestion or rhinorrhea.      Mouth/Throat:      Mouth: Mucous membranes are moist.      Pharynx: Oropharynx is clear. No oropharyngeal exudate or posterior oropharyngeal erythema.   Eyes:      General: No scleral icterus.        Right eye: No discharge.         Left eye: No discharge.      Extraocular Movements: Extraocular movements intact.      Conjunctiva/sclera: Conjunctivae normal.      Pupils: Pupils are equal, round, and reactive to light.   Cardiovascular:      Rate and Rhythm: Normal rate and regular rhythm.      Pulses: " Normal pulses.      Heart sounds: Normal heart sounds. No murmur heard.    No friction rub. No gallop.   Pulmonary:      Effort: Pulmonary effort is normal. No respiratory distress.      Breath sounds: Normal breath sounds. No stridor. No wheezing, rhonchi or rales.   Chest:      Chest wall: No tenderness.   Abdominal:      General: Bowel sounds are normal. There is no distension.      Palpations: Abdomen is soft. There is no mass.      Tenderness: There is no abdominal tenderness. There is no guarding or rebound.      Hernia: No hernia is present.   Musculoskeletal:         General: Tenderness (Mild tenderness on palpating para spinal muscles in the mid and upper thoracic region) present. No swelling, deformity or signs of injury. Normal range of motion.      Cervical back: Normal range of motion and neck supple. No rigidity.      Right lower leg: No edema.      Left lower leg: No edema.   Lymphadenopathy:      Cervical: No cervical adenopathy.   Skin:     General: Skin is warm and dry.      Capillary Refill: Capillary refill takes less than 2 seconds.      Coloration: Skin is not jaundiced or pale.      Findings: No bruising, erythema, lesion or rash.   Neurological:      General: No focal deficit present.      Mental Status: He is alert and oriented to person, place, and time.      Cranial Nerves: No cranial nerve deficit.      Sensory: No sensory deficit.      Motor: No weakness.      Coordination: Coordination normal.      Deep Tendon Reflexes: Reflexes normal.   Psychiatric:         Mood and Affect: Mood normal.         Behavior: Behavior normal.         Thought Content: Thought content normal.         Judgment: Judgment normal.    __________________________________________________________  Questionnaires to be scanned to the media section of patient's EHR records:    1-PHQ-9.  2-MIKE-7.  3-Safety at home.  4-Drug use questionnaire  -------------------------------  Past medical history, Family history, Social  history and Allergies were reviewed.      Labs:    Lab Results   Component Value Date     (H) 11/25/2022     11/25/2022    K 4.0 11/25/2022     11/25/2022    CO2 24 11/25/2022    BUN 14 11/25/2022    CREATININE 0.81 11/25/2022    CALCIUM 10.2 11/25/2022    PROT 8.5 (H) 11/19/2022    ALBUMIN 4.9 11/25/2022    ALBUMIN 4.9 11/19/2022    BILITOT 0.4 11/25/2022    ALKPHOS <5 (L) 11/19/2022    AST 13 11/25/2022    ALT 11 11/25/2022    ANIONGAP 13 11/19/2022    ESTGFRAFRICA >60 09/21/2013    EGFRNONAA >60 09/21/2013     Lab Results   Component Value Date    WBC 6.9 11/25/2022    WBC 10.32 11/19/2022    RBC 4.65 11/25/2022    RBC 4.59 (L) 11/19/2022    HGB 14.2 11/25/2022    HGB 14.0 11/19/2022    HCT 41.0 11/25/2022    HCT 39.8 (L) 11/19/2022    MCV 88 11/25/2022    MCV 87 11/19/2022    RDW 12.0 11/25/2022    RDW 11.8 11/19/2022     11/25/2022     11/19/2022      Lab Results   Component Value Date    CHOL 131 11/25/2022    TRIG 41 11/25/2022    HDL 51 11/25/2022    LDLCALC 70 11/25/2022     Lab Results   Component Value Date    TSH 2.290 11/25/2022     No results found for: HGBA1C, ESTIMATEDAVG   No components found for: MICROALBUMIN/CREATININE    ASSESSMENT & PLAN:    1. Routine general medical examination at a health care facility    2. Adjustment disorder with mixed anxiety and depressed mood  - hydrOXYzine (ATARAX) 50 MG tablet; Take 50 mg by mouth.  - clonazePAM (KLONOPIN) 1 MG tablet; Take 2 mg by mouth.  - Maryse's wort 300 mg Cap; Take 300 mg by mouth once daily.; Refill: 0    3. Adjustment insomnia  - hydrOXYzine (ATARAX) 50 MG tablet; Take 50 mg by mouth.  - clonazePAM (KLONOPIN) 1 MG tablet; Take 2 mg by mouth.  - melatonin 5 mg Cap; Take 1 capsule (5 mg total) by mouth nightly as needed (INSOMNIA).; Refill: 0    4. Hyperglycemia  - Basic Metabolic Panel; Future  - Hemoglobin A1C; Future    5. Chronic right-sided thoracic back pain  - ibuprofen (ADVIL,MOTRIN) 800 MG tablet;  Take 1 tablet (800 mg total) by mouth after meals as needed for Pain (Joint and muscle pain and headache).  Dispense: 100 tablet; Refill: 1  - tiZANidine (ZANAFLEX) 4 MG tablet; Take 1 tablet (4 mg total) by mouth nightly as needed (Muscle spasm).  Dispense: 90 tablet; Refill: 1    6. BMI 26.0-26.9,adult    7. Tobacco use    8. Encounter for tobacco use cessation counseling       Patient with upper back pain which is musculoskeletal as well as anxiety and depression, he was advised to take over-the-counter medications and talk to his psychiatrist about adjusting his medication if necessary, symptomatic treatment of his upper back pain, labs were ordered and follow-up after the labs.    He was advised to quit smoking however he does not look to be ready for that, and healthy diet and exercise were recommended with ultimate goal BMI below 25.       Orders Placed This Encounter   Procedures    Basic Metabolic Panel    Hemoglobin A1C      Follow up in about 3 months (around 5/28/2023), or if symptoms worsen or fail to improve. or sooner as needed.    Patient was counseled and questions and concerns were addressed.    Please note:  Parts of this report were done using a dictation software, voice to text, and sometimes the text contains some uncorrected words or sentences that are missed during revision.

## 2023-05-10 ENCOUNTER — OFFICE VISIT (OUTPATIENT)
Dept: INTERNAL MEDICINE | Facility: CLINIC | Age: 33
End: 2023-05-10
Payer: MEDICAID

## 2023-05-10 VITALS
HEIGHT: 68 IN | HEART RATE: 71 BPM | SYSTOLIC BLOOD PRESSURE: 108 MMHG | DIASTOLIC BLOOD PRESSURE: 69 MMHG | TEMPERATURE: 98 F | WEIGHT: 168.56 LBS | BODY MASS INDEX: 25.55 KG/M2

## 2023-05-10 DIAGNOSIS — R73.9 HYPERGLYCEMIA: ICD-10-CM

## 2023-05-10 DIAGNOSIS — D64.9 LOW HEMATOCRIT: ICD-10-CM

## 2023-05-10 DIAGNOSIS — Z72.0 TOBACCO USE: ICD-10-CM

## 2023-05-10 DIAGNOSIS — F51.02 ADJUSTMENT INSOMNIA: ICD-10-CM

## 2023-05-10 DIAGNOSIS — G89.29 CHRONIC RIGHT-SIDED THORACIC BACK PAIN: Primary | ICD-10-CM

## 2023-05-10 DIAGNOSIS — F43.23 ADJUSTMENT DISORDER WITH MIXED ANXIETY AND DEPRESSED MOOD: ICD-10-CM

## 2023-05-10 DIAGNOSIS — M54.6 CHRONIC RIGHT-SIDED THORACIC BACK PAIN: Primary | ICD-10-CM

## 2023-05-10 PROCEDURE — 3074F SYST BP LT 130 MM HG: CPT | Mod: CPTII,S$GLB,, | Performed by: INTERNAL MEDICINE

## 2023-05-10 PROCEDURE — 99214 OFFICE O/P EST MOD 30 MIN: CPT | Mod: S$GLB,,, | Performed by: INTERNAL MEDICINE

## 2023-05-10 PROCEDURE — 3008F PR BODY MASS INDEX (BMI) DOCUMENTED: ICD-10-PCS | Mod: CPTII,S$GLB,, | Performed by: INTERNAL MEDICINE

## 2023-05-10 PROCEDURE — 3078F PR MOST RECENT DIASTOLIC BLOOD PRESSURE < 80 MM HG: ICD-10-PCS | Mod: CPTII,S$GLB,, | Performed by: INTERNAL MEDICINE

## 2023-05-10 PROCEDURE — 3008F BODY MASS INDEX DOCD: CPT | Mod: CPTII,S$GLB,, | Performed by: INTERNAL MEDICINE

## 2023-05-10 PROCEDURE — 99214 PR OFFICE/OUTPT VISIT, EST, LEVL IV, 30-39 MIN: ICD-10-PCS | Mod: S$GLB,,, | Performed by: INTERNAL MEDICINE

## 2023-05-10 PROCEDURE — 1160F PR REVIEW ALL MEDS BY PRESCRIBER/CLIN PHARMACIST DOCUMENTED: ICD-10-PCS | Mod: CPTII,S$GLB,, | Performed by: INTERNAL MEDICINE

## 2023-05-10 PROCEDURE — 1159F PR MEDICATION LIST DOCUMENTED IN MEDICAL RECORD: ICD-10-PCS | Mod: CPTII,S$GLB,, | Performed by: INTERNAL MEDICINE

## 2023-05-10 PROCEDURE — 3078F DIAST BP <80 MM HG: CPT | Mod: CPTII,S$GLB,, | Performed by: INTERNAL MEDICINE

## 2023-05-10 PROCEDURE — 1160F RVW MEDS BY RX/DR IN RCRD: CPT | Mod: CPTII,S$GLB,, | Performed by: INTERNAL MEDICINE

## 2023-05-10 PROCEDURE — 3074F PR MOST RECENT SYSTOLIC BLOOD PRESSURE < 130 MM HG: ICD-10-PCS | Mod: CPTII,S$GLB,, | Performed by: INTERNAL MEDICINE

## 2023-05-10 PROCEDURE — 1159F MED LIST DOCD IN RCRD: CPT | Mod: CPTII,S$GLB,, | Performed by: INTERNAL MEDICINE

## 2023-05-10 RX ORDER — MELOXICAM 15 MG/1
15 TABLET ORAL DAILY PRN
Qty: 90 TABLET | Refills: 1 | Status: SHIPPED | OUTPATIENT
Start: 2023-05-10

## 2023-05-10 RX ORDER — CLONAZEPAM 0.5 MG/1
1 TABLET ORAL 3 TIMES DAILY
COMMUNITY
Start: 2023-04-17 | End: 2023-05-10

## 2023-05-10 RX ORDER — METHOCARBAMOL 750 MG/1
750 TABLET, FILM COATED ORAL 3 TIMES DAILY PRN
Qty: 90 TABLET | Refills: 1 | Status: SHIPPED | OUTPATIENT
Start: 2023-05-10

## 2023-05-10 RX ORDER — BUSPIRONE HYDROCHLORIDE 10 MG/1
10 TABLET ORAL 2 TIMES DAILY
Qty: 60 TABLET | Refills: 1 | Status: SHIPPED | OUTPATIENT
Start: 2023-05-10 | End: 2023-08-08 | Stop reason: SDUPTHER

## 2023-05-10 RX ORDER — DULOXETIN HYDROCHLORIDE 60 MG/1
60 CAPSULE, DELAYED RELEASE ORAL
COMMUNITY
Start: 2023-04-24 | End: 2023-05-10

## 2023-05-10 NOTE — PROGRESS NOTES
Chief C/o:    Back Pain (Pt. c/o pain in lower back for more than 3 months.)        Health Care Maintenance    Health Maintenance    Patient has no pending health maintenance at this time                HISTORY OF PRESENT ILLNESS:    SAMINA Yoo is a 33 y.o. male who presents to the clinic today for Back Pain (Pt. c/o pain in lower back for more than 3 months.)  .   Patient continues to have upper back pain, mostly in the right upper back area interscapular region, with no history of recent trauma or fall.    He has history of vaginal disorder with anxiety and depression, he was prescribed Lexapro before however patient did not take the medicine after reading about it and gurgling it.  He knows somebody was similar complain as he has and he was prescribed in the past BuSpar and he feels BuSpar could be better for him.    He reported that he took before clonazepam and used to help him.  He has no chest pain, no shortness of breath, no nausea, no vomiting, no other complain.                ALLERGIES AND MEDICATIONS: updated and reviewed.  Review of patient's allergies indicates:  No Known Allergies  Medication List with Changes/Refills   New Medications    BUSPIRONE (BUSPAR) 10 MG TABLET    Take 1 tablet (10 mg total) by mouth 2 (two) times daily.    MELOXICAM (MOBIC) 15 MG TABLET    Take 1 tablet (15 mg total) by mouth daily as needed for Pain.    METHOCARBAMOL (ROBAXIN) 750 MG TAB    Take 1 tablet (750 mg total) by mouth 3 (three) times daily as needed (Muscular pain).   Discontinued Medications    CLONAZEPAM (KLONOPIN) 0.5 MG TABLET    Take 1 mg by mouth 3 (three) times daily.    CLONAZEPAM (KLONOPIN) 1 MG TABLET    Take 2 mg by mouth.    DULOXETINE (CYMBALTA) 60 MG CAPSULE    Take 60 mg by mouth.    HYDROXYZINE (ATARAX) 50 MG TABLET    Take 50 mg by mouth.    IBUPROFEN (ADVIL,MOTRIN) 800 MG TABLET    Take 1 tablet (800 mg total) by mouth after meals as needed for Pain (Joint and muscle pain and headache).     MELATONIN 5 MG CAP    Take 1 capsule (5 mg total) by mouth nightly as needed (INSOMNIA).    CYNDI'S WORT 300 MG CAP    Take 300 mg by mouth once daily.    TIZANIDINE (ZANAFLEX) 4 MG TABLET    Take 1 tablet (4 mg total) by mouth nightly as needed (Muscle spasm).       Problem List:  Patient Active Problem List   Diagnosis    Pilonidal cyst    BMI 25.0-25.9,adult    Chronic right-sided thoracic back pain    Low hematocrit    Tobacco use    Encounter for tobacco use cessation counseling    Hyperglycemia    Adjustment disorder with mixed anxiety and depressed mood    Adjustment insomnia         CARE TEAM:    Patient Care Team:  Humaira Perez MD as PCP - General (Internal Medicine)         REVIEW OF SYSTEMS:    Review of Systems   Constitutional:  Negative for appetite change, chills, diaphoresis, fatigue, fever and unexpected weight change.   HENT:  Negative for congestion, drooling, ear discharge, ear pain, facial swelling, hearing loss, nosebleeds, rhinorrhea, sinus pain, sneezing, sore throat, tinnitus, trouble swallowing and voice change.    Eyes:  Negative for pain, discharge, redness, itching and visual disturbance.   Respiratory:  Negative for cough, choking, chest tightness, shortness of breath, wheezing and stridor.    Cardiovascular:  Negative for chest pain, palpitations and leg swelling.   Gastrointestinal:  Negative for abdominal distention, abdominal pain, blood in stool, constipation, diarrhea, nausea and vomiting.   Endocrine: Negative for cold intolerance, heat intolerance, polydipsia, polyphagia and polyuria.   Genitourinary:  Negative for difficulty urinating, dysuria, flank pain, frequency, hematuria and urgency.   Musculoskeletal:  Positive for back pain (Thoracic back pain). Negative for arthralgias, gait problem, joint swelling, myalgias, neck pain and neck stiffness.   Skin:  Negative for color change, pallor, rash and wound.   Allergic/Immunologic: Negative for environmental  "allergies, food allergies and immunocompromised state.   Neurological:  Negative for dizziness, tremors, seizures, syncope, speech difficulty, weakness, light-headedness, numbness and headaches.   Hematological:  Negative for adenopathy. Does not bruise/bleed easily.   Psychiatric/Behavioral:  Positive for dysphoric mood and sleep disturbance. Negative for agitation, behavioral problems, confusion, decreased concentration, hallucinations and suicidal ideas. The patient is nervous/anxious.        PHYSICAL EXAM:    Vitals:    05/10/23 1435   BP: 108/69   Pulse: 71   Temp: 98.1 °F (36.7 °C)     Weight: 76.5 kg (168 lb 8.7 oz)   Height: 5' 8" (172.7 cm)   Body mass index is 25.63 kg/m².  Vitals:    05/10/23 1435   BP: 108/69   Pulse: 71   Temp: 98.1 °F (36.7 °C)   TempSrc: Temporal   Weight: 76.5 kg (168 lb 8.7 oz)   Height: 5' 8" (1.727 m)   PainSc:   5   PainLoc: Back          Physical Exam  Vitals and nursing note reviewed.   Constitutional:       General: He is not in acute distress.     Appearance: He is not ill-appearing, toxic-appearing or diaphoretic.      Comments: Patient is alert, awake and oriented X 3.  Patient is comfortable, cooperative and in no apparent distress.  Overweight   HENT:      Head: Normocephalic and atraumatic.      Right Ear: Tympanic membrane, ear canal and external ear normal. There is no impacted cerumen.      Left Ear: Tympanic membrane, ear canal and external ear normal. There is no impacted cerumen.      Nose: Nose normal. No congestion or rhinorrhea.      Mouth/Throat:      Mouth: Mucous membranes are moist.      Pharynx: Oropharynx is clear. No oropharyngeal exudate or posterior oropharyngeal erythema.   Eyes:      General: No scleral icterus.        Right eye: No discharge.         Left eye: No discharge.      Extraocular Movements: Extraocular movements intact.      Conjunctiva/sclera: Conjunctivae normal.      Pupils: Pupils are equal, round, and reactive to light. "   Cardiovascular:      Rate and Rhythm: Normal rate and regular rhythm.      Pulses: Normal pulses.      Heart sounds: Normal heart sounds. No murmur heard.    No friction rub. No gallop.   Pulmonary:      Effort: Pulmonary effort is normal. No respiratory distress.      Breath sounds: Normal breath sounds. No stridor. No wheezing, rhonchi or rales.   Chest:      Chest wall: No tenderness.   Abdominal:      General: Bowel sounds are normal. There is no distension.      Palpations: Abdomen is soft. There is no mass.      Tenderness: There is no abdominal tenderness. There is no guarding or rebound.      Hernia: No hernia is present.   Musculoskeletal:         General: Tenderness (Mild tenderness on palpating para spinal muscles in the mid and upper thoracic region) present. No swelling, deformity or signs of injury. Normal range of motion.      Cervical back: Normal range of motion and neck supple. No rigidity.      Right lower leg: No edema.      Left lower leg: No edema.   Lymphadenopathy:      Cervical: No cervical adenopathy.   Skin:     General: Skin is warm and dry.      Capillary Refill: Capillary refill takes less than 2 seconds.      Coloration: Skin is not jaundiced or pale.      Findings: No bruising, erythema, lesion or rash.   Neurological:      General: No focal deficit present.      Mental Status: He is alert and oriented to person, place, and time.      Cranial Nerves: No cranial nerve deficit.      Sensory: No sensory deficit.      Motor: No weakness.      Coordination: Coordination normal.      Deep Tendon Reflexes: Reflexes normal.   Psychiatric:         Mood and Affect: Mood normal.         Behavior: Behavior normal.         Thought Content: Thought content normal.         Judgment: Judgment normal.          Labs:    Lab Results   Component Value Date     (H) 11/25/2022     11/25/2022    K 4.0 11/25/2022     11/25/2022    CO2 24 11/25/2022    BUN 14 11/25/2022    CREATININE 0.81  11/25/2022    CALCIUM 10.2 11/25/2022    PROT 8.5 (H) 11/19/2022    ALBUMIN 4.9 11/25/2022    ALBUMIN 4.9 11/19/2022    BILITOT 0.4 11/25/2022    ALKPHOS <5 (L) 11/19/2022    AST 13 11/25/2022    ALT 11 11/25/2022    ANIONGAP 13 11/19/2022    ESTGFRAFRICA >60 09/21/2013    EGFRNONAA >60 09/21/2013     Lab Results   Component Value Date    WBC 6.9 11/25/2022    WBC 10.32 11/19/2022    RBC 4.65 11/25/2022    RBC 4.59 (L) 11/19/2022    HGB 14.2 11/25/2022    HGB 14.0 11/19/2022    HCT 41.0 11/25/2022    HCT 39.8 (L) 11/19/2022    MCV 88 11/25/2022    MCV 87 11/19/2022    RDW 12.0 11/25/2022    RDW 11.8 11/19/2022     11/25/2022     11/19/2022      Lab Results   Component Value Date    CHOL 131 11/25/2022    TRIG 41 11/25/2022    HDL 51 11/25/2022    LDLCALC 70 11/25/2022     Lab Results   Component Value Date    TSH 2.290 11/25/2022     No results found for: HGBA1C, ESTIMATEDAVG   No components found for: MICROALBUMIN/CREATININE    ASSESSMENT & PLAN:    1. Chronic right-sided thoracic back pain  - methocarbamoL (ROBAXIN) 750 MG Tab; Take 1 tablet (750 mg total) by mouth 3 (three) times daily as needed (Muscular pain).  Dispense: 90 tablet; Refill: 1  - meloxicam (MOBIC) 15 MG tablet; Take 1 tablet (15 mg total) by mouth daily as needed for Pain.  Dispense: 90 tablet; Refill: 1    2. Adjustment disorder with mixed anxiety and depressed mood  - busPIRone (BUSPAR) 10 MG tablet; Take 1 tablet (10 mg total) by mouth 2 (two) times daily.  Dispense: 60 tablet; Refill: 1    3. Adjustment insomnia    4. Tobacco use    5. Hyperglycemia  - Comprehensive Metabolic Panel; Future  - Hemoglobin A1C; Future  - Comprehensive Metabolic Panel  - Hemoglobin A1C    6. Low hematocrit    7. BMI 25.0-25.9,adult     Patient was prescribed BuSpar for his adjustment disorder as well as meloxicam and Robaxin for his back pain.  Basic labs were ordered as well, healthy diet and exercise were recommended as well as tobacco  cessation    Follow-up after about 3 weeks and when needed..  Patient was counseled at length and his questions were ordered, he was encouraged to take his medication as prescribed, and if he has a different opinion to contact prescriber before making decision to stop the medicine.        Orders Placed This Encounter   Procedures    Comprehensive Metabolic Panel    Hemoglobin A1C      No follow-ups on file. or sooner as needed.    Patient was counseled and questions and concerns were addressed.    Please note:  Parts of this report were done using a dictation software, voice to text, and sometimes the text contains some uncorrected words or sentences that are missed during revision.

## 2023-05-23 LAB
ALBUMIN SERPL-MCNC: 4.5 G/DL (ref 4–5)
ALBUMIN/GLOB SERPL: 2 {RATIO} (ref 1.2–2.2)
ALP SERPL-CCNC: 42 IU/L (ref 44–121)
ALT SERPL-CCNC: 13 IU/L (ref 0–44)
AST SERPL-CCNC: 8 IU/L (ref 0–40)
BILIRUB SERPL-MCNC: 0.6 MG/DL (ref 0–1.2)
BUN SERPL-MCNC: 11 MG/DL (ref 6–20)
BUN/CREAT SERPL: 13 (ref 9–20)
CALCIUM SERPL-MCNC: 9.3 MG/DL (ref 8.7–10.2)
CHLORIDE SERPL-SCNC: 104 MMOL/L (ref 96–106)
CO2 SERPL-SCNC: 25 MMOL/L (ref 20–29)
CREAT SERPL-MCNC: 0.87 MG/DL (ref 0.76–1.27)
EST. GFR  (NO RACE VARIABLE): 116 ML/MIN/1.73
GLOBULIN SER CALC-MCNC: 2.3 G/DL (ref 1.5–4.5)
GLUCOSE SERPL-MCNC: 83 MG/DL (ref 70–99)
HBA1C MFR BLD: 5.6 % (ref 4.8–5.6)
POTASSIUM SERPL-SCNC: 4.7 MMOL/L (ref 3.5–5.2)
PROT SERPL-MCNC: 6.8 G/DL (ref 6–8.5)
SODIUM SERPL-SCNC: 141 MMOL/L (ref 134–144)

## 2023-06-07 ENCOUNTER — OFFICE VISIT (OUTPATIENT)
Dept: INTERNAL MEDICINE | Facility: CLINIC | Age: 33
End: 2023-06-07
Payer: MEDICAID

## 2023-06-07 VITALS
DIASTOLIC BLOOD PRESSURE: 69 MMHG | TEMPERATURE: 97 F | HEIGHT: 68 IN | HEART RATE: 84 BPM | SYSTOLIC BLOOD PRESSURE: 114 MMHG | BODY MASS INDEX: 25.03 KG/M2 | WEIGHT: 165.13 LBS

## 2023-06-07 DIAGNOSIS — R53.83 MALAISE AND FATIGUE: ICD-10-CM

## 2023-06-07 DIAGNOSIS — R53.81 MALAISE AND FATIGUE: ICD-10-CM

## 2023-06-07 DIAGNOSIS — F43.23 ADJUSTMENT DISORDER WITH MIXED ANXIETY AND DEPRESSED MOOD: ICD-10-CM

## 2023-06-07 DIAGNOSIS — Z71.6 ENCOUNTER FOR TOBACCO USE CESSATION COUNSELING: ICD-10-CM

## 2023-06-07 DIAGNOSIS — Z72.0 TOBACCO USE: ICD-10-CM

## 2023-06-07 DIAGNOSIS — F51.02 ADJUSTMENT INSOMNIA: ICD-10-CM

## 2023-06-07 DIAGNOSIS — R73.9 HYPERGLYCEMIA: Primary | ICD-10-CM

## 2023-06-07 PROCEDURE — 99213 OFFICE O/P EST LOW 20 MIN: CPT | Mod: S$GLB,,, | Performed by: INTERNAL MEDICINE

## 2023-06-07 PROCEDURE — 3078F DIAST BP <80 MM HG: CPT | Mod: CPTII,S$GLB,, | Performed by: INTERNAL MEDICINE

## 2023-06-07 PROCEDURE — 3008F PR BODY MASS INDEX (BMI) DOCUMENTED: ICD-10-PCS | Mod: CPTII,S$GLB,, | Performed by: INTERNAL MEDICINE

## 2023-06-07 PROCEDURE — 3074F PR MOST RECENT SYSTOLIC BLOOD PRESSURE < 130 MM HG: ICD-10-PCS | Mod: CPTII,S$GLB,, | Performed by: INTERNAL MEDICINE

## 2023-06-07 PROCEDURE — 1159F MED LIST DOCD IN RCRD: CPT | Mod: CPTII,S$GLB,, | Performed by: INTERNAL MEDICINE

## 2023-06-07 PROCEDURE — 1159F PR MEDICATION LIST DOCUMENTED IN MEDICAL RECORD: ICD-10-PCS | Mod: CPTII,S$GLB,, | Performed by: INTERNAL MEDICINE

## 2023-06-07 PROCEDURE — 3074F SYST BP LT 130 MM HG: CPT | Mod: CPTII,S$GLB,, | Performed by: INTERNAL MEDICINE

## 2023-06-07 PROCEDURE — 1160F PR REVIEW ALL MEDS BY PRESCRIBER/CLIN PHARMACIST DOCUMENTED: ICD-10-PCS | Mod: CPTII,S$GLB,, | Performed by: INTERNAL MEDICINE

## 2023-06-07 PROCEDURE — 99213 PR OFFICE/OUTPT VISIT, EST, LEVL III, 20-29 MIN: ICD-10-PCS | Mod: S$GLB,,, | Performed by: INTERNAL MEDICINE

## 2023-06-07 PROCEDURE — 3008F BODY MASS INDEX DOCD: CPT | Mod: CPTII,S$GLB,, | Performed by: INTERNAL MEDICINE

## 2023-06-07 PROCEDURE — 3044F HG A1C LEVEL LT 7.0%: CPT | Mod: CPTII,S$GLB,, | Performed by: INTERNAL MEDICINE

## 2023-06-07 PROCEDURE — 1160F RVW MEDS BY RX/DR IN RCRD: CPT | Mod: CPTII,S$GLB,, | Performed by: INTERNAL MEDICINE

## 2023-06-07 PROCEDURE — 3078F PR MOST RECENT DIASTOLIC BLOOD PRESSURE < 80 MM HG: ICD-10-PCS | Mod: CPTII,S$GLB,, | Performed by: INTERNAL MEDICINE

## 2023-06-07 PROCEDURE — 3044F PR MOST RECENT HEMOGLOBIN A1C LEVEL <7.0%: ICD-10-PCS | Mod: CPTII,S$GLB,, | Performed by: INTERNAL MEDICINE

## 2023-06-07 RX ORDER — CLONAZEPAM 1 MG/1
TABLET ORAL
COMMUNITY
Start: 2023-05-17

## 2023-06-07 RX ORDER — DULOXETIN HYDROCHLORIDE 60 MG/1
60 CAPSULE, DELAYED RELEASE ORAL
COMMUNITY
Start: 2023-05-17

## 2023-06-07 RX ORDER — HYDROXYZINE HYDROCHLORIDE 50 MG/1
50 TABLET, FILM COATED ORAL 3 TIMES DAILY
COMMUNITY
Start: 2023-05-17

## 2023-06-07 NOTE — PROGRESS NOTES
Chief C/o:    Follow-up (Lab results check.) and Fatigue (Pt. C/o having fever, fatigue and weakness x 4 days.)        Health Care Maintenance    Health Maintenance    Patient has no pending health maintenance at this time                HISTORY OF PRESENT ILLNESS:    SAMINA Yoo is a 33 y.o. male who presents to the clinic today for Follow-up (Lab results check.) and Fatigue (Pt. C/o having fever, fatigue and weakness x 4 days.)  .   Patient is feeling weak and tired over the past 3 days and he is having subjective fever, he did not check his temperature at home, he has no cough, no congestion, and no other new complain.                ALLERGIES AND MEDICATIONS: updated and reviewed.  Review of patient's allergies indicates:  No Known Allergies  Medication List with Changes/Refills   Current Medications    BUSPIRONE (BUSPAR) 10 MG TABLET    Take 1 tablet (10 mg total) by mouth 2 (two) times daily.    CLONAZEPAM (KLONOPIN) 1 MG TABLET    Take by mouth.    DULOXETINE (CYMBALTA) 60 MG CAPSULE    Take 60 mg by mouth.    HYDROXYZINE (ATARAX) 50 MG TABLET    Take 50 mg by mouth 3 (three) times daily.    MELOXICAM (MOBIC) 15 MG TABLET    Take 1 tablet (15 mg total) by mouth daily as needed for Pain.    METHOCARBAMOL (ROBAXIN) 750 MG TAB    Take 1 tablet (750 mg total) by mouth 3 (three) times daily as needed (Muscular pain).       Problem List:  Patient Active Problem List   Diagnosis    Pilonidal cyst    BMI 25.0-25.9,adult    Chronic right-sided thoracic back pain    Low hematocrit    Tobacco use    Encounter for tobacco use cessation counseling    Hyperglycemia    Adjustment disorder with mixed anxiety and depressed mood    Adjustment insomnia         CARE TEAM:    Patient Care Team:  Humaira Perez MD as PCP - General (Internal Medicine)         REVIEW OF SYSTEMS:    Review of Systems   Constitutional:  Positive for fatigue and fever. Negative for appetite change, chills, diaphoresis and unexpected  "weight change.   HENT:  Negative for congestion, drooling, ear discharge, ear pain, facial swelling, hearing loss, nosebleeds, rhinorrhea, sinus pain, sneezing, sore throat, tinnitus, trouble swallowing and voice change.    Eyes:  Negative for pain, discharge, redness, itching and visual disturbance.   Respiratory:  Negative for cough, choking, chest tightness, shortness of breath, wheezing and stridor.    Cardiovascular:  Negative for chest pain, palpitations and leg swelling.   Gastrointestinal:  Negative for abdominal distention, abdominal pain, blood in stool, constipation, diarrhea, nausea and vomiting.   Endocrine: Negative for cold intolerance, heat intolerance, polydipsia, polyphagia and polyuria.   Genitourinary:  Negative for difficulty urinating, dysuria, flank pain, frequency, hematuria and urgency.   Musculoskeletal:  Positive for back pain (Thoracic back pain). Negative for arthralgias, gait problem, joint swelling, myalgias, neck pain and neck stiffness.   Skin:  Negative for color change, pallor, rash and wound.   Allergic/Immunologic: Negative for environmental allergies, food allergies and immunocompromised state.   Neurological:  Negative for dizziness, tremors, seizures, syncope, speech difficulty, weakness, light-headedness, numbness and headaches.   Hematological:  Negative for adenopathy. Does not bruise/bleed easily.   Psychiatric/Behavioral:  Positive for dysphoric mood and sleep disturbance. Negative for agitation, behavioral problems, confusion, decreased concentration, hallucinations and suicidal ideas. The patient is nervous/anxious.        PHYSICAL EXAM:    Vitals:    06/07/23 1405   BP: 114/69   Pulse: 84   Temp: 97 °F (36.1 °C)     Weight: 74.9 kg (165 lb 2 oz)   Height: 5' 8" (172.7 cm)   Body mass index is 25.11 kg/m².  Vitals:    06/07/23 1405   BP: 114/69   Pulse: 84   Temp: 97 °F (36.1 °C)   TempSrc: Temporal   Weight: 74.9 kg (165 lb 2 oz)   Height: 5' 8" (1.727 m)   PainSc: " 0-No pain          Physical Exam  Vitals and nursing note reviewed.   Constitutional:       General: He is not in acute distress.     Appearance: He is not ill-appearing, toxic-appearing or diaphoretic.      Comments: Patient is alert, awake and oriented X 3.  Patient is comfortable, cooperative and in no apparent distress.  Overweight   HENT:      Head: Normocephalic and atraumatic.      Right Ear: Tympanic membrane, ear canal and external ear normal. There is no impacted cerumen.      Left Ear: Tympanic membrane, ear canal and external ear normal. There is no impacted cerumen.      Nose: Nose normal. No congestion or rhinorrhea.      Mouth/Throat:      Mouth: Mucous membranes are moist.      Pharynx: Oropharynx is clear. No oropharyngeal exudate or posterior oropharyngeal erythema.   Eyes:      General: No scleral icterus.        Right eye: No discharge.         Left eye: No discharge.      Extraocular Movements: Extraocular movements intact.      Conjunctiva/sclera: Conjunctivae normal.      Pupils: Pupils are equal, round, and reactive to light.   Cardiovascular:      Rate and Rhythm: Normal rate and regular rhythm.      Pulses: Normal pulses.      Heart sounds: Normal heart sounds. No murmur heard.    No friction rub. No gallop.   Pulmonary:      Effort: Pulmonary effort is normal. No respiratory distress.      Breath sounds: Normal breath sounds. No stridor. No wheezing, rhonchi or rales.   Chest:      Chest wall: No tenderness.   Abdominal:      General: Bowel sounds are normal. There is no distension.      Palpations: Abdomen is soft. There is no mass.      Tenderness: There is no abdominal tenderness. There is no guarding or rebound.      Hernia: No hernia is present.   Musculoskeletal:         General: Tenderness (Mild tenderness on palpating para spinal muscles in the mid and upper thoracic region) present. No swelling, deformity or signs of injury. Normal range of motion.      Cervical back: Normal range  of motion and neck supple. No rigidity.      Right lower leg: No edema.      Left lower leg: No edema.   Lymphadenopathy:      Cervical: No cervical adenopathy.   Skin:     General: Skin is warm and dry.      Capillary Refill: Capillary refill takes less than 2 seconds.      Coloration: Skin is not jaundiced or pale.      Findings: No bruising, erythema, lesion or rash.   Neurological:      General: No focal deficit present.      Mental Status: He is alert and oriented to person, place, and time.      Cranial Nerves: No cranial nerve deficit.      Sensory: No sensory deficit.      Motor: No weakness.      Coordination: Coordination normal.      Deep Tendon Reflexes: Reflexes normal.   Psychiatric:         Mood and Affect: Mood normal.         Behavior: Behavior normal.         Thought Content: Thought content normal.         Judgment: Judgment normal.          Labs:    Lab Results   Component Value Date    GLU 83 05/22/2023     05/22/2023    K 4.7 05/22/2023     05/22/2023    CO2 25 05/22/2023    BUN 11 05/22/2023    CREATININE 0.87 05/22/2023    CALCIUM 9.3 05/22/2023    PROT 8.5 (H) 11/19/2022    ALBUMIN 4.5 05/22/2023    ALBUMIN 4.9 11/19/2022    BILITOT 0.6 05/22/2023    ALKPHOS <5 (L) 11/19/2022    AST 8 05/22/2023    ALT 13 05/22/2023    ANIONGAP 13 11/19/2022    ESTGFRAFRICA >60 09/21/2013    EGFRNONAA >60 09/21/2013     Lab Results   Component Value Date    WBC 6.9 11/25/2022    WBC 10.32 11/19/2022    RBC 4.65 11/25/2022    RBC 4.59 (L) 11/19/2022    HGB 14.2 11/25/2022    HGB 14.0 11/19/2022    HCT 41.0 11/25/2022    HCT 39.8 (L) 11/19/2022    MCV 88 11/25/2022    MCV 87 11/19/2022    RDW 12.0 11/25/2022    RDW 11.8 11/19/2022     11/25/2022     11/19/2022      Lab Results   Component Value Date    CHOL 131 11/25/2022    TRIG 41 11/25/2022    HDL 51 11/25/2022    LDLCALC 70 11/25/2022     Lab Results   Component Value Date    TSH 2.290 11/25/2022     Lab Results   Component Value  Date    HGBA1C 5.6 05/22/2023      No components found for: MICROALBUMIN/CREATININE    ASSESSMENT & PLAN:    1. Hyperglycemia    2. Adjustment disorder with mixed anxiety and depressed mood    3. Adjustment insomnia    4. Tobacco use    5. Encounter for tobacco use cessation counseling    6. BMI 25.0-25.9,adult    7. Malaise and fatigue     Patient blood test was essentially unremarkable, his hemoglobin A1c was normal, to continue with healthy diet and exercise, he is doing well on BuSpar and will continue the same does, advised to quit smoking, has malaise and fatigue and subjective fever and unremarkable physical examination, to keep well hydrated and observe, for any distress to the emergency room, for any new symptoms may come to the office for re-evaluation at any time otherwise will see the patient 3 months time and when needed.        No orders of the defined types were placed in this encounter.     Follow up in about 3 months (around 9/7/2023), or if symptoms worsen or fail to improve. or sooner as needed.    Patient was counseled and questions and concerns were addressed.    Please note:  Parts of this report were done using a dictation software, voice to text, and sometimes the text contains some uncorrected words or sentences that are missed during revision.

## 2023-08-08 DIAGNOSIS — F43.23 ADJUSTMENT DISORDER WITH MIXED ANXIETY AND DEPRESSED MOOD: ICD-10-CM

## 2023-08-08 RX ORDER — BUSPIRONE HYDROCHLORIDE 10 MG/1
10 TABLET ORAL 2 TIMES DAILY
Qty: 60 TABLET | Refills: 3 | Status: SHIPPED | OUTPATIENT
Start: 2023-08-08 | End: 2024-08-07

## 2023-08-08 NOTE — PROGRESS NOTES
Chief C/o:    No chief complaint on file.        Health Care Maintenance    Health Maintenance    Patient has no pending health maintenance at this time                HISTORY OF PRESENT ILLNESS:    SAMINA Yoo is a 33 y.o. male who presents to the clinic today for No chief complaint on file.  .                   ALLERGIES AND MEDICATIONS: updated and reviewed.  Review of patient's allergies indicates:  No Known Allergies  Medication List with Changes/Refills   Current Medications    BUSPIRONE (BUSPAR) 10 MG TABLET    Take 1 tablet (10 mg total) by mouth 2 (two) times daily.    CLONAZEPAM (KLONOPIN) 1 MG TABLET    Take by mouth.    DULOXETINE (CYMBALTA) 60 MG CAPSULE    Take 60 mg by mouth.    HYDROXYZINE (ATARAX) 50 MG TABLET    Take 50 mg by mouth 3 (three) times daily.    MELOXICAM (MOBIC) 15 MG TABLET    Take 1 tablet (15 mg total) by mouth daily as needed for Pain.    METHOCARBAMOL (ROBAXIN) 750 MG TAB    Take 1 tablet (750 mg total) by mouth 3 (three) times daily as needed (Muscular pain).       Problem List:  Patient Active Problem List   Diagnosis    Pilonidal cyst    BMI 25.0-25.9,adult    Chronic right-sided thoracic back pain    Low hematocrit    Tobacco use    Encounter for tobacco use cessation counseling    Hyperglycemia    Adjustment disorder with mixed anxiety and depressed mood    Adjustment insomnia         CARE TEAM:    Patient Care Team:  Humaira Perez MD as PCP - General (Internal Medicine)         REVIEW OF SYSTEMS:    Review of Systems      PHYSICAL EXAM:    There were no vitals filed for this visit.          There is no height or weight on file to calculate BMI.  There were no vitals filed for this visit.       Physical Exam       Labs:    Lab Results   Component Value Date    GLU 83 05/22/2023     05/22/2023    K 4.7 05/22/2023     05/22/2023    CO2 25 05/22/2023    BUN 11 05/22/2023    CREATININE 0.87 05/22/2023    CALCIUM 9.3 05/22/2023    PROT 8.5 (H)  "11/19/2022    ALBUMIN 4.5 05/22/2023    ALBUMIN 4.9 11/19/2022    BILITOT 0.6 05/22/2023    ALKPHOS <5 (L) 11/19/2022    AST 8 05/22/2023    ALT 13 05/22/2023    ANIONGAP 13 11/19/2022    ESTGFRAFRICA >60 09/21/2013    EGFRNONAA >60 09/21/2013     Lab Results   Component Value Date    WBC 6.9 11/25/2022    WBC 10.32 11/19/2022    RBC 4.65 11/25/2022    RBC 4.59 (L) 11/19/2022    HGB 14.2 11/25/2022    HGB 14.0 11/19/2022    HCT 41.0 11/25/2022    HCT 39.8 (L) 11/19/2022    MCV 88 11/25/2022    MCV 87 11/19/2022    RDW 12.0 11/25/2022    RDW 11.8 11/19/2022     11/25/2022     11/19/2022      Lab Results   Component Value Date    CHOL 131 11/25/2022    TRIG 41 11/25/2022    HDL 51 11/25/2022    LDLCALC 70 11/25/2022     Lab Results   Component Value Date    TSH 2.290 11/25/2022     Lab Results   Component Value Date    HGBA1C 5.6 05/22/2023      No components found for: "MICROALBUMIN/CREATININE"    ASSESSMENT & PLAN:    There are no diagnoses linked to this encounter.           No orders of the defined types were placed in this encounter.     No follow-ups on file. or sooner as needed.    Patient was counseled and questions and concerns were addressed.    Please note:  Parts of this report were done using a dictation software, voice to text, and sometimes the text contains some uncorrected words or sentences that are missed during revision.    "

## 2024-04-03 ENCOUNTER — HOSPITAL ENCOUNTER (EMERGENCY)
Facility: HOSPITAL | Age: 34
Discharge: HOME | End: 2024-04-03
Attending: EMERGENCY MEDICINE
Payer: MEDICAID

## 2024-04-03 VITALS
SYSTOLIC BLOOD PRESSURE: 101 MMHG | WEIGHT: 160 LBS | BODY MASS INDEX: 24.25 KG/M2 | HEIGHT: 68 IN | TEMPERATURE: 99 F | DIASTOLIC BLOOD PRESSURE: 60 MMHG | OXYGEN SATURATION: 99 % | RESPIRATION RATE: 18 BRPM | HEART RATE: 73 BPM

## 2024-04-03 DIAGNOSIS — G47.9 SLEEPING DIFFICULTY: ICD-10-CM

## 2024-04-03 DIAGNOSIS — R10.9 LEFT SIDED ABDOMINAL PAIN: Primary | ICD-10-CM

## 2024-04-03 LAB — POCT GLUCOSE: 89 MG/DL (ref 70–110)

## 2024-04-03 PROCEDURE — 99283 EMERGENCY DEPT VISIT LOW MDM: CPT

## 2024-04-03 PROCEDURE — 82962 GLUCOSE BLOOD TEST: CPT

## 2024-04-03 RX ORDER — POLYETHYLENE GLYCOL 3350 17 G/17G
17 POWDER, FOR SOLUTION ORAL DAILY
Qty: 14 EACH | Refills: 0 | Status: SHIPPED | OUTPATIENT
Start: 2024-04-03 | End: 2024-04-17

## 2024-04-03 NOTE — DISCHARGE INSTRUCTIONS
Recommend ibuprofen as needed for pain control.    Seek medical care if symptoms worsen or any concerns.    Please follow up with the primary care provider for re-evaluation within the next 2-3 days.

## 2024-04-03 NOTE — ED PROVIDER NOTES
"Encounter Date: 4/3/2024       History     Chief Complaint   Patient presents with    Abdominal Pain     Patient arrives with pain to right upper abdomen for the past week. Denies NVD. Reports that he "feels something" when he presses down in the area. Last BM yesterday.      34-year-old male presenting with left-sided intermittent abdominal pain for the last 2 weeks.  Patient reports history anxiety.  Patient states that he has intermittently had left-sided abdominal pain.  Pain sometimes is during daytime or nighttime.  He states that sometimes it wakes him from sleep.  He has no pain at this time.  Denies any association with eating or drinking.  Denies any recent dysuria urinary frequency.  He does report straining with bowel movements over the last 2 weeks.  He denies any melena or hematochezia.  Denies any loose stools.  Patient has not tried any analgesia.  The patient is also requesting help with sleep aids.  Patient states that he normally has difficulty sleeping.      Review of patient's allergies indicates:  No Known Allergies  No past medical history on file.  No past surgical history on file.  No family history on file.     Review of Systems   Constitutional:  Negative for chills and fever.   HENT:  Negative for congestion.    Respiratory:  Negative for cough and shortness of breath.    Gastrointestinal:  Positive for abdominal pain. Negative for blood in stool, diarrhea, nausea and vomiting.   Genitourinary:  Negative for dysuria, flank pain and frequency.   Musculoskeletal:  Negative for back pain.   Skin:  Negative for rash.   Neurological:  Negative for dizziness, light-headedness and headaches.       Physical Exam     Initial Vitals [04/03/24 0055]   BP Pulse Resp Temp SpO2   101/60 73 18 98.7 °F (37.1 °C) 99 %      MAP       --         Physical Exam    Nursing note and vitals reviewed.  Constitutional: He appears well-developed. He is not diaphoretic. No distress.   HENT:   Head: Normocephalic. "   Eyes: EOM are normal.   Cardiovascular:  Normal rate and regular rhythm.           No murmur heard.  Pulmonary/Chest: Effort normal and breath sounds normal. He has no wheezes.   Abdominal: Abdomen is soft and flat. He exhibits no distension. There is no abdominal tenderness. No hernia.   No right CVA tenderness.  No left CVA tenderness. There is no rebound, no guarding and negative Mistry's sign.   Musculoskeletal:         General: Normal range of motion.     Neurological: He is alert.   Skin: Skin is warm.         ED Course   Procedures  Labs Reviewed   POCT GLUCOSE   POCT GLUCOSE MONITORING CONTINUOUS          Imaging Results    None          Medications - No data to display  Medical Decision Making    34-year-old male presenting with intermittent abdominal pain episodes.  Patient has no abdominal pain at this time.  No tenderness on exam.  No abdominal mass on palpation.  No CVAT.  No urinary symptoms.  It does reports straining with bowel movements.  Recommendation is starting a stool softener, increasing vegetable intake, increasing fluid intake and using ibuprofen as needed for pain control.  Patient denies any melena or hematochezia to suggest GI bleed.    Patient also reporting difficulty with sleep.  Discussed good sleep hygiene habits.  Patient states he had tried melatonin with minimal improvement.  Recommended doxylamine nightly as needed for sleep difficulty.    Differential diagnosis includes hyperglycemia, constipation    Risk  OTC drugs.                                      Clinical Impression:  Final diagnoses:  [R10.9] Left sided abdominal pain (Primary)  [G47.9] Sleeping difficulty          ED Disposition Condition    Discharge Stable          ED Prescriptions       Medication Sig Dispense Start Date End Date Auth. Provider    doxylamine succinate 25 mg tablet Take 1 tablet (25 mg total) by mouth nightly as needed for Insomnia. 40 tablet 4/3/2024 -- Shadi Velazquez MD    polyethylene glycol  (GLYCOLAX) 17 gram PwPk Take 17 g by mouth once daily. for 14 days 14 each 4/3/2024 4/17/2024 Shadi Velazquez MD          Follow-up Information       Follow up With Specialties Details Why Contact Info [facility]  Schedule an appointment as soon as possible for a visit in 1 week Primary care [address] [facility] [id number] [phone]    [facility] Emergency Dept Emergency Medicine  If symptoms worsen [address] [facility] [place] [id number] [phone]             Shadi Velazquez MD  04/03/24 8637

## 2024-04-03 NOTE — ED NOTES
Pt presents to ED with left sided abd pain that has been going on for a while on and off. He is not in pain right now. Pt also states that he has had trouble with bowel movements for the past few weeks and has to push hard. Pt denies N/V/D, chest pain or shortness of breath    LOC: Pt is awake alert and aware of environment, oriented X3 and speaking appropriately  Appearance: Pt is in no acute distress, Pt is well groomed and clean  Skin: skin is warm and dry with normal turgor, mucus membranes are moist and pink, skin is intact with no bruising or breakdown  Muskuloskeletal: Pt moves all extremities well, there is no obvious swelling or deformities noted, pulses are intact.  Respiratory: Airway is open and patent, respirations are spontaneous and even.  Cardiac:  no edema and cap refill is <3sec  Abdomen: soft, non-tender and non-distended  Neuro: Pt follows commands easily and has no obvious deficits

## (undated) DEVICE — CLIPPER BLADE MOD 4406 (CAREF)

## (undated) DEVICE — CHLORAPREP W TINT 26ML APPL

## (undated) DEVICE — ELECTRODE REM PLYHSV RETURN 9

## (undated) DEVICE — COVER OVERHEAD SURG LT BLUE

## (undated) DEVICE — BLANKET UPPER BODY 78.7X29.9IN

## (undated) DEVICE — PILLOW HEAD REST

## (undated) DEVICE — GAUZE SPONGE 4X4 12PLY

## (undated) DEVICE — CRADLE LAMINECTOMY ARM

## (undated) DEVICE — GLOVE BIOGEL ECLIPSE SZ 7.5

## (undated) DEVICE — SEE L#120831

## (undated) DEVICE — POSITIONER IV ARMBOARD FOAM

## (undated) DEVICE — PAD ABD 8X10 STERILE

## (undated) DEVICE — SUT CTD VICRYL 3-0 CR/SH

## (undated) DEVICE — Device

## (undated) DEVICE — SUT ETHILON 3/0 18IN PS-1